# Patient Record
Sex: MALE | Race: AMERICAN INDIAN OR ALASKA NATIVE
[De-identification: names, ages, dates, MRNs, and addresses within clinical notes are randomized per-mention and may not be internally consistent; named-entity substitution may affect disease eponyms.]

---

## 2018-06-06 ENCOUNTER — HOSPITAL ENCOUNTER (INPATIENT)
Dept: HOSPITAL 31 - C.ER | Age: 58
LOS: 5 days | Discharge: HOME | DRG: 73 | End: 2018-06-11
Attending: INTERNAL MEDICINE | Admitting: INTERNAL MEDICINE
Payer: COMMERCIAL

## 2018-06-06 DIAGNOSIS — E87.6: ICD-10-CM

## 2018-06-06 DIAGNOSIS — E11.319: ICD-10-CM

## 2018-06-06 DIAGNOSIS — K27.3: ICD-10-CM

## 2018-06-06 DIAGNOSIS — E11.65: ICD-10-CM

## 2018-06-06 DIAGNOSIS — K85.90: ICD-10-CM

## 2018-06-06 DIAGNOSIS — E55.9: ICD-10-CM

## 2018-06-06 DIAGNOSIS — I12.9: ICD-10-CM

## 2018-06-06 DIAGNOSIS — E86.0: ICD-10-CM

## 2018-06-06 DIAGNOSIS — E11.43: Primary | ICD-10-CM

## 2018-06-06 DIAGNOSIS — K31.84: ICD-10-CM

## 2018-06-06 DIAGNOSIS — Z79.4: ICD-10-CM

## 2018-06-06 DIAGNOSIS — B37.81: ICD-10-CM

## 2018-06-06 DIAGNOSIS — E46: ICD-10-CM

## 2018-06-06 DIAGNOSIS — B19.20: ICD-10-CM

## 2018-06-06 DIAGNOSIS — E11.22: ICD-10-CM

## 2018-06-06 DIAGNOSIS — K29.70: ICD-10-CM

## 2018-06-06 DIAGNOSIS — F17.210: ICD-10-CM

## 2018-06-06 DIAGNOSIS — F11.23: ICD-10-CM

## 2018-06-06 DIAGNOSIS — N18.9: ICD-10-CM

## 2018-06-06 DIAGNOSIS — N17.9: ICD-10-CM

## 2018-06-06 DIAGNOSIS — K92.0: ICD-10-CM

## 2018-06-06 LAB
ALBUMIN SERPL-MCNC: 4.1 G/DL (ref 3.5–5)
ALBUMIN/GLOB SERPL: 1 {RATIO} (ref 1–2.1)
ALT SERPL-CCNC: 17 U/L (ref 21–72)
AST SERPL-CCNC: 54 U/L (ref 17–59)
BACTERIA #/AREA URNS HPF: (no result) /[HPF]
BASOPHILS # BLD AUTO: 0 K/UL (ref 0–0.2)
BASOPHILS NFR BLD: 0.3 % (ref 0–2)
BILIRUB UR-MCNC: NEGATIVE MG/DL
BUN SERPL-MCNC: 75 MG/DL (ref 9–20)
CALCIUM SERPL-MCNC: 9.4 MG/DL (ref 8.6–10.4)
CK MB SERPL-MCNC: 1.75 NG/ML (ref 0–3.38)
EOSINOPHIL # BLD AUTO: 0 K/UL (ref 0–0.7)
EOSINOPHIL NFR BLD: 0.1 % (ref 0–4)
ERYTHROCYTE [DISTWIDTH] IN BLOOD BY AUTOMATED COUNT: 13.3 % (ref 11.5–14.5)
GFR NON-AFRICAN AMERICAN: 23
GLUCOSE UR STRIP-MCNC: NORMAL MG/DL
HGB BLD-MCNC: 17.8 G/DL (ref 12–18)
INR PPP: 1.2
LEUKOCYTE ESTERASE UR-ACNC: (no result) LEU/UL
LIPASE: 560 U/L (ref 23–300)
LYMPHOCYTES # BLD AUTO: 2.8 K/UL (ref 1–4.3)
LYMPHOCYTES NFR BLD AUTO: 21.9 % (ref 20–40)
MCH RBC QN AUTO: 27.6 PG (ref 27–31)
MCHC RBC AUTO-ENTMCNC: 33 G/DL (ref 33–37)
MCV RBC AUTO: 83.5 FL (ref 80–94)
MONOCYTES # BLD: 1 K/UL (ref 0–0.8)
MONOCYTES NFR BLD: 7.4 % (ref 0–10)
NEUTROPHILS # BLD: 9.1 K/UL (ref 1.8–7)
NEUTROPHILS NFR BLD AUTO: 70.3 % (ref 50–75)
NRBC BLD AUTO-RTO: 0.9 % (ref 0–2)
PH UR STRIP: 5 [PH] (ref 5–8)
PLATELET # BLD: 286 K/UL (ref 130–400)
PMV BLD AUTO: 9.7 FL (ref 7.2–11.7)
PROT UR STRIP-MCNC: (no result) MG/DL
PROTHROMBIN TIME: 13.5 SECONDS (ref 9.7–12.2)
RBC # BLD AUTO: 6.47 MIL/UL (ref 4.4–5.9)
RBC # UR STRIP: NEGATIVE /UL
SP GR UR STRIP: 1.02 (ref 1–1.03)
TROPONIN I SERPL-MCNC: 0.03 NG/ML (ref 0–0.12)
UROBILINOGEN UR-MCNC: NORMAL MG/DL (ref 0.2–1)
WBC # BLD AUTO: 13 K/UL (ref 4.8–10.8)

## 2018-06-06 RX ADMIN — HUMAN INSULIN SCH: 100 INJECTION, SOLUTION SUBCUTANEOUS at 22:24

## 2018-06-06 NOTE — C.PDOC
Chief Complaint (Nursing): Abnormal Labs





Past Medical History


Vital Signs: 





 Last Vital Signs











Temp  97.6 F   06/06/18 15:03


 


Pulse  120 H  06/06/18 15:03


 


Resp  18   06/06/18 15:03


 


BP  173/137 H  06/06/18 15:03


 


Pulse Ox  98   06/06/18 15:03














- Social History


Hx Alcohol Use: No


Hx Substance Use: Yes





ED Course And Treatment


O2 Sat by Pulse Oximetry: 98





Disposition





- Disposition

## 2018-06-06 NOTE — C.PDOC
History Of Present Illness


57 y/o male with PMHx of diabetes and hypertension sent to the ED by Dr. Martinez 

for abnormal labs. Patient has been vomiting for a few days and feeling 

dehydrated as per Dr. Martinez. He then had blood work done, revealing elevated 

blood sugar and slightly elevated white count, with creatinine of 2 and BUN 67. 

Dr. Martinez also felt the patient may have gastritis. Otherwise denies any 

diarrhea, fever, chills, or other complaints. 





Time Seen by Provider: 18 15:14


Chief Complaint (Nursing): Abnormal Labs


History Per: Patient


History/Exam Limitations: no limitations


Onset/Duration Of Symptoms: Days


Current Symptoms Are (Timing): Still Present





Past Medical History


Reviewed: Historical Data, Nursing Documentation, Vital Signs


Vital Signs: 


 Last Vital Signs











Temp  97.6 F   18 15:03


 


Pulse  89   18 17:40


 


Resp  18   18 17:40


 


BP  198/108 H  18 17:40


 


Pulse Ox  98   18 17:52














- Medical History


PMH: Diabetes, HTN


Family History: States: No Known Family Hx





- Social History


Hx Alcohol Use: No


Hx Substance Use: Yes





Review Of Systems


Except As Marked, All Systems Reviewed And Found Negative.


Constitutional: Positive for: Other (feeling dehydrated).  Negative for: Fever, 

Chills


Cardiovascular: Negative for: Chest Pain


Respiratory: Negative for: Shortness of Breath


Gastrointestinal: Positive for: Nausea, Vomiting.  Negative for: Diarrhea





Physical Exam





- Physical Exam


Appears: Non-toxic, No Acute Distress


Skin: Normal Color, Warm, Dry


Head: Atraumatic, Normacephalic


Eye(s): bilateral: Normal Inspection, PERRL, EOMI


Nose: Normal


Oral Mucosa: Moist


Neck: Normal ROM, Supple


Cardiovascular: Rhythm Regular, No Murmur


Respiratory: Normal Breath Sounds, No Rales, No Rhonchi, No Wheezing


Gastrointestinal/Abdominal: Soft, Tenderness (epigastric), No Guarding, No 

Rebound


Back: Normal Inspection, No CVA Tenderness, No Vertebral Tenderness


Extremity: Bilateral: Atraumatic, Normal Color And Temperature, Normal ROM


Pulses: Left Dorsalis Pedis: Normal, Right Dorsalis Pedis: Normal


Neurological/Psych: Oriented x3, Normal Speech, Other (No focal deficits)


Gait: Steady





ED Course And Treatment





- Laboratory Results


Result Diagrams: 


 18 16:35





 18 16:35


Lab Interpretation: Abnormal


ECG: Interpreted By Me


ECG Rhythm: Sinus Rhythm, Nonspecific Changes


ECG Interpretation: No Acute Changes


Rate From EC


O2 Sat by Pulse Oximetry: 98 (RA)


Pulse Ox Interpretation: Normal





- Radiology


CXR: Interpreted by Me


CXR Interpretation: Yes: No Acute Disease


Progress Note: Treated with NSS x 1 liters, protonix and maalox.  Treated with 

clonidine 0.1 mg PO for elevated B/P


Reassessment Condition: Improved





- Physician Consult Information


Time Consulting Physician Contacted: 17:07


Physician Contacted: Adithya Martinez


Outcome Of Conversation: Discussed case with Dr. Martinez, patient accepted to 

his service for admission





Medical Decision Making


Medical Decision Making: 





Initial Plan:


--EKG


--Lipase


--CMP


--CBC


--Prothrombin time


--Accucheck


--Urinalysis


--Pepcid 20 mg IVP


--Zofran 4 mg IVP


--Maalox 30 ml PO


--IV fluids





Labs reviewed, revealing elevated BUN and creatitine. Glucose 219. Lipase 

elevated, 560.





Paged Dr. Martinez. Patient will be admitted for dehydration and pancreatitis. 








Disposition


Discussed With : Adithya Martinez


Doctor Will See Patient In The: Hospital


Counseled Patient/Family Regarding: Studies Performed, Diagnosis





- Disposition


Disposition: HOSPITALIZED


Disposition Time: 17:09


Condition: STABLE





- POA


Present On Arrival: None





- Clinical Impression


Clinical Impression: 


 Dehydration, Pancreatitis








- PA / NP / Resident Statement


MD/DO has reviewed & agrees with the documentation as recorded.





- Scribe Statement


The provider has reviewed the documentation as recorded by the Scribe (Carmen Last)





All medical record entries made by the Scribe were at my direction and 

personally dictated by me. I have reviewed the chart and agree that the record 

accurately reflects my personal performance of the history, physical exam, 

medical decision making, and the department course for this patient. I have 

also personally directed, reviewed, and agree with the discharge instructions 

and disposition.





Decision To Admit





- Pt Status Changed To:


Hospital Disposition Of: Inpatient





- Admit Certification


Admit to Inpatient:: After my assessment, the patient will require 

hospitalization for at least two midnights.  This is because of the severity of 

symptoms shown, intensity of services needed, and/or the medical risk in this 

patient being treated as an outpatient.





- InPatient:


Physician Admission Certification: I certify that this patient requires 2 or 

more midnights of care for the following reason:: Dehydration.  Pancreatitis





- .


Bed Request Type: Regular


Admitting Physician: Adithya Martinez


Patient Diagnosis: 


 Dehydration, Pancreatitis

## 2018-06-07 VITALS — RESPIRATION RATE: 20 BRPM

## 2018-06-07 LAB
ALBUMIN SERPL-MCNC: 3.7 G/DL (ref 3.5–5)
ALBUMIN/GLOB SERPL: 1.1 {RATIO} (ref 1–2.1)
ALT SERPL-CCNC: 21 U/L (ref 21–72)
AST SERPL-CCNC: 32 U/L (ref 17–59)
BASOPHILS # BLD AUTO: 0 K/UL (ref 0–0.2)
BASOPHILS NFR BLD: 0.1 % (ref 0–2)
BUN SERPL-MCNC: 60 MG/DL (ref 9–20)
CALCIUM SERPL-MCNC: 8.9 MG/DL (ref 8.6–10.4)
EOSINOPHIL # BLD AUTO: 0 K/UL (ref 0–0.7)
EOSINOPHIL NFR BLD: 0 % (ref 0–4)
ERYTHROCYTE [DISTWIDTH] IN BLOOD BY AUTOMATED COUNT: 13.2 % (ref 11.5–14.5)
GFR NON-AFRICAN AMERICAN: 27
HDLC SERPL-MCNC: 59 MG/DL (ref 30–70)
HGB BLD-MCNC: 16.6 G/DL (ref 12–18)
LDLC SERPL-MCNC: 78 MG/DL (ref 0–129)
LIPASE: 291 U/L (ref 23–300)
LYMPHOCYTES # BLD AUTO: 3.3 K/UL (ref 1–4.3)
LYMPHOCYTES NFR BLD AUTO: 26.1 % (ref 20–40)
MCH RBC QN AUTO: 28.2 PG (ref 27–31)
MCHC RBC AUTO-ENTMCNC: 33.7 G/DL (ref 33–37)
MCV RBC AUTO: 83.7 FL (ref 80–94)
MONOCYTES # BLD: 0.9 K/UL (ref 0–0.8)
MONOCYTES NFR BLD: 6.8 % (ref 0–10)
NEUTROPHILS # BLD: 8.6 K/UL (ref 1.8–7)
NEUTROPHILS NFR BLD AUTO: 67 % (ref 50–75)
NRBC BLD AUTO-RTO: 0.1 % (ref 0–2)
PLATELET # BLD: 255 K/UL (ref 130–400)
PMV BLD AUTO: 9.7 FL (ref 7.2–11.7)
RBC # BLD AUTO: 5.89 MIL/UL (ref 4.4–5.9)
WBC # BLD AUTO: 12.8 K/UL (ref 4.8–10.8)

## 2018-06-07 PROCEDURE — 0DB68ZX EXCISION OF STOMACH, VIA NATURAL OR ARTIFICIAL OPENING ENDOSCOPIC, DIAGNOSTIC: ICD-10-PCS | Performed by: SPECIALIST

## 2018-06-07 RX ADMIN — DEXTROSE AND SODIUM CHLORIDE SCH MLS/HR: 5; 450 INJECTION, SOLUTION INTRAVENOUS at 21:10

## 2018-06-07 RX ADMIN — WATER SCH MLS: 1 INJECTION INTRAMUSCULAR; INTRAVENOUS; SUBCUTANEOUS at 12:08

## 2018-06-07 RX ADMIN — HUMAN INSULIN SCH: 100 INJECTION, SOLUTION SUBCUTANEOUS at 08:25

## 2018-06-07 RX ADMIN — HUMAN INSULIN SCH: 100 INJECTION, SOLUTION SUBCUTANEOUS at 21:57

## 2018-06-07 RX ADMIN — ENOXAPARIN SODIUM SCH: 40 INJECTION SUBCUTANEOUS at 11:01

## 2018-06-07 RX ADMIN — HUMAN INSULIN SCH: 100 INJECTION, SOLUTION SUBCUTANEOUS at 12:55

## 2018-06-07 RX ADMIN — DEXTROSE AND SODIUM CHLORIDE SCH MLS/HR: 5; 450 INJECTION, SOLUTION INTRAVENOUS at 10:50

## 2018-06-07 RX ADMIN — HUMAN INSULIN SCH UNIT: 100 INJECTION, SOLUTION SUBCUTANEOUS at 17:10

## 2018-06-07 RX ADMIN — WATER SCH MLS/HR: 1 INJECTION INTRAMUSCULAR; INTRAVENOUS; SUBCUTANEOUS at 20:50

## 2018-06-07 NOTE — CARD
--------------- APPROVED REPORT --------------





EKG Measurement

Heart [id number]

ME [id number]

[id number]

[id number]

[id number]



<Conclusion>

Normal sinus rhythm

T wave abnormality: NONSPECIFIC

Abnormal ECG

## 2018-06-07 NOTE — CP.PCM.PN
Subjective





- Date & Time of Evaluation


Date of Evaluation: 06/07/18


Time of Evaluation: 09:45





- Subjective


Subjective: 














Progress note.

















Attending: Dr. Martinez

















Pt seen and examined at bedside. No acute distress. No events overnight. Pt 

does admit to heroin abuse, psych consult placed. No fevers, chills, no more 

vomiting, no diarrhea. Pt is legally blind. 























PMH: Heroin abuse, DM, HTN, legally blind








PSH: 











Allergies: NKDA











FH: Denies











Social hx: Denies smoking. Denies current drinking. Reports current heroin use. 

Intransal. 3-4 bags per day. Last use 2 days ago. 





Objective





- Vital Signs/Intake and Output


Vital Signs (last 24 hours): 


 











Temp Pulse Resp BP Pulse Ox


 


 97.9 F   96 H  20   157/93 H  97 


 


 06/07/18 08:00  06/07/18 08:00  06/07/18 08:00  06/07/18 08:00  06/07/18 08:00








Intake and Output: 


 











 06/07/18 06/07/18





 06:59 18:59


 


Intake Total  800


 


Output Total  500


 


Balance  300














- Medications


Medications: 


 Current Medications





Amlodipine Besylate (Norvasc)  10 mg PO DAILY PRABHA


Dextrose (Dextrose 50% Inj)  0 ml IV STAT PRN; Protocol


   PRN Reason: Hypoglycemia Protocol


Dextrose (Glutose 15)  0 gm PO ONCE PRN; Protocol


   PRN Reason: Hypoglycemia Protocol


Enoxaparin Sodium (Lovenox)  40 mg SC DAILY PRABHA


Glucagon (Glucagen Diagnostic Kit)  0 mg IM STAT PRN; Protocol


   PRN Reason: Hypoglycemia Protocol


Hydromorphone HCl (Dilaudid)  1 mg PO Q4H PRN


   PRN Reason: Pain, moderate (4-7)


Sodium Chloride (Sodium Chloride 0.45%)  1,000 mls @ 100 mls/hr IV .Q10H PRABHA


   Last Admin: 06/07/18 08:31 Dose:  100 mls/hr


Dextrose (Dextrose 5% In Water 1000 Ml)  1,000 mls @ 0 mls/hr IV .Q0M PRN; 

Protocol; Per Protocol


   PRN Reason: Hypoglycemia Protocol


Insulin Human Regular (Novolin R)  0 unit SC ACHS PRABHA


   PRN Reason: Protocol


   Last Admin: 06/07/18 08:25 Dose:  Not Given


Ondansetron HCl (Zofran Inj)  4 mg IVP Q6 PRN


   PRN Reason: Nausea/Vomiting


Pantoprazole Sodium (Protonix Inj)  40 mg IVP BID PRABHA


Pneumococcal Polyvalent Vaccine (Pneumovax 23 Vaccine)  0.5 ml IM .ONCE ONE


   Stop: 06/08/18 10:01











- Labs


Labs: 


 





 06/07/18 07:13 





 06/07/18 07:23 





 











PT  13.5 SECONDS (9.7-12.2)  H  06/06/18  16:35    


 


INR  1.2   06/06/18  16:35    














- Constitutional


Appears: Non-toxic, No Acute Distress, Unkempt





- Head Exam


Head Exam: ATRAUMATIC, NORMAL INSPECTION, NORMOCEPHALIC





- Eye Exam


Eye Exam: EOMI





- ENT Exam


ENT Exam: Mucous Membranes Moist





- Neck Exam


Neck Exam: Full ROM, Normal Inspection





- Respiratory Exam


Respiratory Exam: NORMAL BREATHING PATTERN.  absent: Respiratory Distress





- Cardiovascular Exam


Cardiovascular Exam: REGULAR RHYTHM, +S1, +S2





- GI/Abdominal Exam


GI & Abdominal Exam: Soft, Normal Bowel Sounds.  absent: Tenderness





- Extremities Exam


Extremities Exam: Full ROM, Normal Inspection





- Back Exam


Back Exam: NORMAL INSPECTION





- Neurological Exam


Neurological Exam: Alert, Awake, Oriented x3





- Psychiatric Exam


Psychiatric exam: Flat Affect





- Skin


Skin Exam: Dry, Intact, Normal Color, Warm





Assessment and Plan





- Assessment and Plan (Free Text)


Assessment: 

















This is a 59 yo male with

















1. Pancreatitis














-NPO


-GI consult. Dr. Lee. recs appreciated.


-dilaudid 1 mg po q 4 hrs prn


-zofran prn


-1/2  cc/hr


-lipase 560


-abdominal ultrasound pending 














2. Vomiting











-zofran prn nausea


-r/o coffee ground emesis?


-protonix IV bid


-GI consult. recs appreciated. Dr. Lee














3. Increased BUN/CR








-nephrology consult. Dr. Stanley. recs appreciated.


-unclear etiology at this time

















4. Hx of HTN














-continue norvasc 10 mg po daily

















5. hx of DM








-ISS


-hypoglycemia protocol


-check HGB a 1C


-check lipid panel




















6. GI/DVT ppx








-protonix iv bid


-scds











discussed with Dr. Martinez

## 2018-06-07 NOTE — RAD
HISTORY:

abdominal pain  



COMPARISON:

No prior.



TECHNIQUE:

Chest PA and lateral



FINDINGS:



LUNGS:

No active pulmonary disease.



PLEURA:

No significant pleural effusion identified. No pneumothorax apparent.



CARDIOVASCULAR:

Normal.



OSSEOUS STRUCTURES:

No significant abnormalities.



VISUALIZED UPPER ABDOMEN:

Normal.



OTHER FINDINGS:

None.



IMPRESSION:

No acute cardiopulmonary disease appreciated.

## 2018-06-07 NOTE — US
Abdominal ultrasound 



History: Abdominal pain.  Pancreatitis. 



Comparison: None available. 



Technique: Real-time sonography was performed through the abdomen. 



Findings:



Limited study secondary to gas distention of bowel. 



Liver: 11.9 centimeters in length.  Normal echogenicity. 



Gallbladder: No calculi or sludge.  Normal wall thickening of 2.5 

millimeters.  Negative sonographic Cochran's sign. 



Common bile duct measures 1.1 centimeter, dilated. 



Pancreas not well visualized. 



Spleen measures 7.3 centimeters in length, within normal limits. 



Bold 



Visualized aorta and IVC are preserved. 



Right kidney: 8.5 x 5.4 x 5.2 centimeters. Increased echogenicity of 

the renal parenchymal cortex suggestive for medical renal disease. No 

calculi or hydronephrosis. 



Left kidney: 8.8 x 5.2 x 5.5 centimeters. Increased echogenicity of 

the renal parenchymal cortex suggestive for medical renal disease.  

No calculi or hydronephrosis. 



Impression: 



Technically limited study. 



Pancreas not well visualized.  Correlation with CT scan would be 

helpful to exclude pancreatitis. 



Dilated common bile duct measuring up to 1.1 centimeters.  

Correlation with MRCP may be helpful for further evaluation if 

clinically indicated. 



Increased echogenicity of the bilateral renal parenchymal cortices 

suggestive for medical renal disease.  Clinical correlation.

## 2018-06-07 NOTE — CP.PCM.CON
History of Present Illness





- History of Present Illness


History of Present Illness: 


Initial Nephrology Consultation:





Assessment: Stable


recurrent nausea/vomitting with gastritis duodenitis and candidiasis


Acute Kidney Injury (N17.9) likely due to pre-renal state


Diabetic chronic Kidney Disease (E11.22) 


Hypertensive Chronic Kidney Disease (I12.9)


Chronic Kidney Disease (N18.9) Stage ? with ? mg proteinuria (R80.9) likely due 

to HTN (CKD evident by proteinuria and echogenic kidneys)


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 

methadone now


active smoker





Plan


No acute need for renal replacement therapy at this time. 


Hypertension control with meds as ordered. Patient not on ACEI/ARB due to ALLISON. 

added lopressor, continue with norvasc


Monitor Input/Output, daily weights and renal function with basic metabolic 

panel


agree with IVF





Check urine analysis, spot protein/creatinine and albumin/creatinine ratio


Check for 25-OH vitamin D, iPTH, phosphorus level HIV/Hep B and Hep C serology 





Dose meds/antibiotics for reduced GFR. Avoid fleets enema/magnesium based 

laxatives. Avoid nephrotoxins/NSAIDs/ iodinated contrast (unless needed 

emergently)


Glycemic control, smoking cessation


Further work up/management as per primary team





Thanks for allowing me to participate in care of your patient. Will follow 

patient with you. Please call if any Qs. d/w wife and team


Dr Sumit Stern


Office: [phone] Cell: [phone] Fax: [phone]





Chief Complaint; nausea/vomitting


reason for consult: ALLISON


HPI: Pt is a 58 M with hx of diabetes Mellitus (5-6 years), hypertension (5-6 

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 

takes methadone now presented with complaints of persistent nausea/vomitting 

for last few days with upper abdomen pain. renal consult for ALLISON. pt or wife 

not aware about kidney disease in past


Denies OTC/herbal meds has taken NSAIDs as alleve for few doses only. denies 

long term nsaids


No recent iodinated contrast exposure. No obvious episodes of low BP.


feels better now. had egd done 6/7/18





ROS: 


Cardiovascular: No chest pain. 


Pulmonary: No shortness of breath 


Gastrointestinal: improved abdominal pain better nausea. better vomiting. 


Genitourinary: No pain while urinating. Denies blood in urine. reported 

decreased urine output


All other negative





Physical Examination: 


General Appearance: Comfortable, in no acute respiratory distress, co-operative 

. 


Vitals reviewed and noted as below


Head; Atraumatic, normocephalic


ENT: no ulcers no thrush. Tongue is midline. Oropharynx: no rash or ulcers.


EYES: Pupils are equal, round and reactive to light accommodation. Eye muscles 

and extraocular movement intact. Sclera is anicteric. he is legally blind


Neck; supple no lymphadenopathy, no thyromegaly or bruit


Lungs: Normal respiratory rate/effort. Breath sounds bilateral equal and clear


Heart: Normal rate. s1s2 normal. No rub or gallop. 


Extremities: no edema. No varicose veins


Neurological: Patient is alert, awake and oriented to person, place and time. 

No focal deficit. Strength bilateral appropriate and equal


Skin: Warm and dry. Normal turgor. No rash. Palpitation: Normal elasticity for 

age


Abdomen: Abdomen is soft. Bowel sounds +. There is no abdominal tenderness, no 

guarding/rigidity no organomegaly


Psych: limited insight and normal affect/mood


MSK: no joint tenderness or swelling. Digits and nails normal, no deformity


: kidney or bladder not palpable





Labs/imaging reviewed.


Past medical history, past surgical history, family history, social history, 

allergy reviewed and noted as below


Family hx: no hx of CKD. Rest non-contributory 





renal imaging: b/l echogenic kidneys


UA: 2+ protein











Past Patient History





- Past Medical History & Family History


Past Medical History?: Yes





- Past Social History


Smoking Status: Light Smoker < 10 Cigarettes Daily





- CARDIAC


Hx Hypertension: Yes





- HEENT


Hx HEENT Problems: Yes


Hx Blind: Yes (LEGALLY)





- ENDOCRINE/METABOLIC


Hx Endocrine Disorders: Yes


Hx Diabetes Mellitus Type 2: Yes





- MUSCULOSKELETAL/RHEUMATOLOGICAL


Hx Falls: No





- PSYCHIATRIC


Hx Substance Use: Yes





- ANESTHESIA


Hx Anesthesia: Yes


Hx Anesthesia Reactions: No





Meds


Allergies/Adverse Reactions: 


 Allergies











Allergy/AdvReac Type Severity Reaction Status Date / Time


 


No Known Allergies Allergy   Verified 06/06/18 15:06














- Medications


Medications: 


 Current Medications





Amlodipine Besylate (Norvasc)  10 mg PO DAILY [facility]


Dextrose (Dextrose 50% Inj)  0 ml IV STAT PRN; Protocol


   PRN Reason: Hypoglycemia Protocol


Dextrose (Glutose 15)  0 gm PO ONCE PRN; Protocol


   PRN Reason: Hypoglycemia Protocol


Enoxaparin Sodium (Lovenox)  40 mg SC DAILY [facility]


   Last Admin: 06/07/18 11:01 Dose:  Not Given


Fluconazole (Diflucan)  200 mg PO DAILY [facility]


   PRN Reason: Protocol


   Stop: 06/21/18 23:59


   Last Admin: 06/07/18 13:14 Dose:  200 mg


Glucagon (Glucagen Diagnostic Kit)  0 mg IM STAT PRN; Protocol


   PRN Reason: Hypoglycemia Protocol


Hydromorphone HCl (Dilaudid)  1 mg PO Q4H PRN


   PRN Reason: Pain, moderate (4-7)


Dextrose (Dextrose 5% In Water 1000 Ml)  1,000 mls @ 0 mls/hr IV .Q0M PRN; 

Protocol; Per Protocol


   PRN Reason: Hypoglycemia Protocol


Dextrose/Sodium Chloride (Dextrose 5%/0.45% Ns 1000 Ml)  1,000 mls @ 100 mls/hr 

IV .Q10H [facility]


   Last Admin: 06/07/18 10:50 Dose:  100 mls/hr


Metronidazole (Flagyl)  500 mg in 100 mls @ 100 mls/hr IVPB Q8H [facility]


   PRN Reason: Protocol


   Last Admin: 06/07/18 12:08 Dose:  100 mls


Insulin Human Regular (Novolin R)  0 unit SC ACHS PRAB[facility]


   PRN Reason: Protocol


   Last Admin: 06/07/18 12:55 Dose:  Not Given


Metoprolol Tartrate (Lopressor)  25 mg PO BID [facility]


   Last Admin: 06/07/18 13:13 Dose:  25 mg


Ondansetron HCl (Zofran Inj)  4 mg IVP Q6 PRN


   PRN Reason: Nausea/Vomiting


Pantoprazole Sodium (Protonix Inj)  40 mg IVP BID [facility]


   Last Admin: 06/07/18 10:54 Dose:  40 mg


Pneumococcal Polyvalent Vaccine (Pneumovax 23 Vaccine)  0.5 ml IM .ONCE ONE


   Stop: 06/09/18 10:01


Sucralfate (Carafate Tab)  1 gm PO TID [facility]


   Last Admin: 06/07/18 13:14 Dose:  1 gm











Results





- Vital Signs


Recent Vital Signs: 


 Last Vital Signs











Temp  97.6 F   06/07/18 12:56


 


Pulse  108 H  06/07/18 12:56


 


Resp  18   06/07/18 12:56


 


BP  163/106 H  06/07/18 13:13


 


Pulse Ox  98   06/07/18 12:56














- Labs


Result Diagrams: 


 06/07/18 07:13





 06/07/18 07:23


Labs: 


 Laboratory Results - last 24 hr











  06/06/18 06/06/18 06/06/18





  15:15 16:35 16:35


 


WBC   13.0 H 


 


RBC   6.47 H 


 


Hgb   17.8 


 


Hct   54.0 H 


 


MCV   83.5 


 


MCH   27.6 


 


MCHC   33.0 


 


RDW   13.3 


 


Plt Count   286 


 


MPV   9.7 


 


Neut % (Auto)   70.3 


 


Lymph % (Auto)   21.9 


 


Mono % (Auto)   7.4 


 


Eos % (Auto)   0.1 


 


Baso % (Auto)   0.3 


 


Neut # (Auto)   9.1 H 


 


Lymph # (Auto)   2.8 


 


Mono # (Auto)   1.0 H 


 


Eos # (Auto)   0.0 


 


Baso # (Auto)   0.0 


 


Differential Comment    


 


PT    13.5 H


 


INR    1.2


 


Sodium   


 


Potassium   


 


Chloride   


 


Carbon Dioxide   


 


Anion Gap   


 


BUN   


 


Creatinine   


 


Est GFR ( Amer)   


 


Est GFR (Non-Af Amer)   


 


POC Glucose (mg/dL)  219 H  


 


Random Glucose   


 


Hemoglobin A1c   


 


Calcium   


 


Total Bilirubin   


 


AST   


 


ALT   


 


Alkaline Phosphatase   


 


CK-MB (Mass)   


 


Troponin I   


 


Total Protein   


 


Albumin   


 


Globulin   


 


Albumin/Globulin Ratio   


 


Triglycerides   


 


Cholesterol   


 


LDL Cholesterol Direct   


 


HDL Cholesterol   


 


Lipase   


 


Urine Color   


 


Urine Clarity   


 


Urine pH   


 


Ur Specific Gravity   


 


Urine Protein   


 


Urine Glucose (UA)   


 


Urine Ketones   


 


Urine Blood   


 


Urine Nitrate   


 


Urine Bilirubin   


 


Urine Urobilinogen   


 


Ur Leukocyte Esterase   


 


Urine WBC (Auto)   


 


Urine RBC (Auto)   


 


Urine Bacteria   


 


Urine Opiates Screen   


 


Urine Methadone Screen   


 


Ur Barbiturates Screen   


 


Ur Phencyclidine Scrn   


 


Ur Amphetamines Screen   


 


U Benzodiazepines Scrn   


 


U Oth Cocaine Metabols   


 


U Cannabinoids Screen   














  06/06/18 06/06/18 06/06/18





  16:35 16:35 21:30


 


WBC   


 


RBC   


 


Hgb   


 


Hct   


 


MCV   


 


MCH   


 


MCHC   


 


RDW   


 


Plt Count   


 


MPV   


 


Neut % (Auto)   


 


Lymph % (Auto)   


 


Mono % (Auto)   


 


Eos % (Auto)   


 


Baso % (Auto)   


 


Neut # (Auto)   


 


Lymph # (Auto)   


 


Mono # (Auto)   


 


Eos # (Auto)   


 


Baso # (Auto)   


 


Differential Comment   


 


PT   


 


INR   


 


Sodium  137  


 


Potassium  3.9  


 


Chloride  94 L  


 


Carbon Dioxide  25  


 


Anion Gap  22 H  


 


BUN  75 H  


 


Creatinine  2.8 H  


 


Est GFR ( Amer)  28  


 


Est GFR (Non-Af Amer)  23  


 


POC Glucose (mg/dL)   


 


Random Glucose  195 H  


 


Hemoglobin A1c   


 


Calcium  9.4  


 


Total Bilirubin  1.5 H  


 


AST  54  


 


ALT  17 L  


 


Alkaline Phosphatase  121  


 


CK-MB (Mass)   1.75 


 


Troponin I   0.0300 


 


Total Protein  8.1  


 


Albumin  4.1  


 


Globulin  4.1 H  


 


Albumin/Globulin Ratio  1.0  


 


Triglycerides   


 


Cholesterol   


 


LDL Cholesterol Direct   


 


HDL Cholesterol   


 


Lipase  560 H  


 


Urine Color    Yellow


 


Urine Clarity    Clear


 


Urine pH    5.0


 


Ur Specific Gravity    1.017


 


Urine Protein    2+ H


 


Urine Glucose (UA)    Normal


 


Urine Ketones    Negative


 


Urine Blood    Negative


 


Urine Nitrate    Negative


 


Urine Bilirubin    Negative


 


Urine Urobilinogen    Normal


 


Ur Leukocyte Esterase    Neg


 


Urine WBC (Auto)    1


 


Urine RBC (Auto)    3


 


Urine Bacteria    Rare


 


Urine Opiates Screen   


 


Urine Methadone Screen   


 


Ur Barbiturates Screen   


 


Ur Phencyclidine Scrn   


 


Ur Amphetamines Screen   


 


U Benzodiazepines Scrn   


 


U Oth Cocaine Metabols   


 


U Cannabinoids Screen   














  06/06/18 06/07/18 06/07/18





  22:04 07:13 07:23


 


WBC   12.8 H 


 


RBC   5.89 


 


Hgb   16.6 


 


Hct   49.3 


 


MCV   83.7 


 


MCH   28.2 


 


MCHC   33.7 


 


RDW   13.2 


 


Plt Count   255 


 


MPV   9.7 


 


Neut % (Auto)   67.0 


 


Lymph % (Auto)   26.1 


 


Mono % (Auto)   6.8 


 


Eos % (Auto)   0.0 


 


Baso % (Auto)   0.1 


 


Neut # (Auto)   8.6 H 


 


Lymph # (Auto)   3.3 


 


Mono # (Auto)   0.9 H 


 


Eos # (Auto)   0.0 


 


Baso # (Auto)   0.0 


 


Differential Comment   


 


PT   


 


INR   


 


Sodium    140


 


Potassium    3.6


 


Chloride    100


 


Carbon Dioxide    27


 


Anion Gap    17


 


BUN    60 H


 


Creatinine    2.5 H


 


Est GFR ( Amer)    32


 


Est GFR (Non-Af Amer)    27


 


POC Glucose (mg/dL)  88  


 


Random Glucose    61 L


 


Hemoglobin A1c   


 


Calcium    8.9


 


Total Bilirubin    1.1


 


AST    32


 


ALT    21  D


 


Alkaline Phosphatase    103


 


CK-MB (Mass)   


 


Troponin I   


 


Total Protein    7.2


 


Albumin    3.7


 


Globulin    3.4


 


Albumin/Globulin Ratio    1.1


 


Triglycerides    78


 


Cholesterol    159


 


LDL Cholesterol Direct    78


 


HDL Cholesterol    59


 


Lipase    291


 


Urine Color   


 


Urine Clarity   


 


Urine pH   


 


Ur Specific Gravity   


 


Urine Protein   


 


Urine Glucose (UA)   


 


Urine Ketones   


 


Urine Blood   


 


Urine Nitrate   


 


Urine Bilirubin   


 


Urine Urobilinogen   


 


Ur Leukocyte Esterase   


 


Urine WBC (Auto)   


 


Urine RBC (Auto)   


 


Urine Bacteria   


 


Urine Opiates Screen   


 


Urine Methadone Screen   


 


Ur Barbiturates Screen   


 


Ur Phencyclidine Scrn   


 


Ur Amphetamines Screen   


 


U Benzodiazepines Scrn   


 


U Oth Cocaine Metabols   


 


U Cannabinoids Screen   














  06/07/18 06/07/18 06/07/18





  07:26 07:28 09:52


 


WBC   


 


RBC   


 


Hgb   


 


Hct   


 


MCV   


 


MCH   


 


MCHC   


 


RDW   


 


Plt Count   


 


MPV   


 


Neut % (Auto)   


 


Lymph % (Auto)   


 


Mono % (Auto)   


 


Eos % (Auto)   


 


Baso % (Auto)   


 


Neut # (Auto)   


 


Lymph # (Auto)   


 


Mono # (Auto)   


 


Eos # (Auto)   


 


Baso # (Auto)   


 


Differential Comment   


 


PT   


 


INR   


 


Sodium   


 


Potassium   


 


Chloride   


 


Carbon Dioxide   


 


Anion Gap   


 


BUN   


 


Creatinine   


 


Est GFR ( Amer)   


 


Est GFR (Non-Af Amer)   


 


POC Glucose (mg/dL)  69  74  80


 


Random Glucose   


 


Hemoglobin A1c   


 


Calcium   


 


Total Bilirubin   


 


AST   


 


ALT   


 


Alkaline Phosphatase   


 


CK-MB (Mass)   


 


Troponin I   


 


Total Protein   


 


Albumin   


 


Globulin   


 


Albumin/Globulin Ratio   


 


Triglycerides   


 


Cholesterol   


 


LDL Cholesterol Direct   


 


HDL Cholesterol   


 


Lipase   


 


Urine Color   


 


Urine Clarity   


 


Urine pH   


 


Ur Specific Gravity   


 


Urine Protein   


 


Urine Glucose (UA)   


 


Urine Ketones   


 


Urine Blood   


 


Urine Nitrate   


 


Urine Bilirubin   


 


Urine Urobilinogen   


 


Ur Leukocyte Esterase   


 


Urine WBC (Auto)   


 


Urine RBC (Auto)   


 


Urine Bacteria   


 


Urine Opiates Screen   


 


Urine Methadone Screen   


 


Ur Barbiturates Screen   


 


Ur Phencyclidine Scrn   


 


Ur Amphetamines Screen   


 


U Benzodiazepines Scrn   


 


U Oth Cocaine Metabols   


 


U Cannabinoids Screen   














  06/07/18 06/07/18 06/07/18





  09:58 11:54 12:48


 


WBC   


 


RBC   


 


Hgb   


 


Hct   


 


MCV   


 


MCH   


 


MCHC   


 


RDW   


 


Plt Count   


 


MPV   


 


Neut % (Auto)   


 


Lymph % (Auto)   


 


Mono % (Auto)   


 


Eos % (Auto)   


 


Baso % (Auto)   


 


Neut # (Auto)   


 


Lymph # (Auto)   


 


Mono # (Auto)   


 


Eos # (Auto)   


 


Baso # (Auto)   


 


Differential Comment   


 


PT   


 


INR   


 


Sodium   


 


Potassium   


 


Chloride   


 


Carbon Dioxide   


 


Anion Gap   


 


BUN   


 


Creatinine   


 


Est GFR ( Amer)   


 


Est GFR (Non-Af Amer)   


 


POC Glucose (mg/dL)    96


 


Random Glucose   


 


Hemoglobin A1c  7.2 H  


 


Calcium   


 


Total Bilirubin   


 


AST   


 


ALT   


 


Alkaline Phosphatase   


 


CK-MB (Mass)   


 


Troponin I   


 


Total Protein   


 


Albumin   


 


Globulin   


 


Albumin/Globulin Ratio   


 


Triglycerides   


 


Cholesterol   


 


LDL Cholesterol Direct   


 


HDL Cholesterol   


 


Lipase   


 


Urine Color   


 


Urine Clarity   


 


Urine pH   


 


Ur Specific Gravity   


 


Urine Protein   


 


Urine Glucose (UA)   


 


Urine Ketones   


 


Urine Blood   


 


Urine Nitrate   


 


Urine Bilirubin   


 


Urine Urobilinogen   


 


Ur Leukocyte Esterase   


 


Urine WBC (Auto)   


 


Urine RBC (Auto)   


 


Urine Bacteria   


 


Urine Opiates Screen   Positive H 


 


Urine Methadone Screen   Negative 


 


Ur Barbiturates Screen   Negative 


 


Ur Phencyclidine Scrn   Negative 


 


Ur Amphetamines Screen   Negative 


 


U Benzodiazepines Scrn   Negative 


 


U Oth Cocaine Metabols   Negative 


 


U Cannabinoids Screen   Negative

## 2018-06-08 LAB
ALBUMIN SERPL-MCNC: 3.1 G/DL (ref 3.5–5)
ALBUMIN/GLOB SERPL: 1 {RATIO} (ref 1–2.1)
ALT SERPL-CCNC: 19 U/L (ref 21–72)
AST SERPL-CCNC: 35 U/L (ref 17–59)
BASOPHILS # BLD AUTO: 0 K/UL (ref 0–0.2)
BASOPHILS NFR BLD: 0.2 % (ref 0–2)
BUN SERPL-MCNC: 40 MG/DL (ref 9–20)
CALCIUM SERPL-MCNC: 8.1 MG/DL (ref 8.6–10.4)
EOSINOPHIL # BLD AUTO: 0 K/UL (ref 0–0.7)
EOSINOPHIL NFR BLD: 0.1 % (ref 0–4)
ERYTHROCYTE [DISTWIDTH] IN BLOOD BY AUTOMATED COUNT: 12.9 % (ref 11.5–14.5)
GFR NON-AFRICAN AMERICAN: 32
HEPATITIS B CORE AB: NEGATIVE
HEPATITIS C ANTIBODY: REACTIVE
HGB BLD-MCNC: 14.5 G/DL (ref 12–18)
LYMPHOCYTES # BLD AUTO: 4.2 K/UL (ref 1–4.3)
LYMPHOCYTES NFR BLD AUTO: 35.6 % (ref 20–40)
MCH RBC QN AUTO: 27.8 PG (ref 27–31)
MCHC RBC AUTO-ENTMCNC: 33.4 G/DL (ref 33–37)
MCV RBC AUTO: 83.3 FL (ref 80–94)
MONOCYTES # BLD: 0.9 K/UL (ref 0–0.8)
MONOCYTES NFR BLD: 7.6 % (ref 0–10)
NEUTROPHILS # BLD: 6.6 K/UL (ref 1.8–7)
NEUTROPHILS NFR BLD AUTO: 56.5 % (ref 50–75)
NRBC BLD AUTO-RTO: 0.1 % (ref 0–2)
PLATELET # BLD: 229 K/UL (ref 130–400)
PMV BLD AUTO: 10.3 FL (ref 7.2–11.7)
RBC # BLD AUTO: 5.21 MIL/UL (ref 4.4–5.9)
WBC # BLD AUTO: 11.7 K/UL (ref 4.8–10.8)

## 2018-06-08 RX ADMIN — DEXTROSE AND SODIUM CHLORIDE SCH MLS/HR: 5; 450 INJECTION, SOLUTION INTRAVENOUS at 13:14

## 2018-06-08 RX ADMIN — HUMAN INSULIN SCH: 100 INJECTION, SOLUTION SUBCUTANEOUS at 21:53

## 2018-06-08 RX ADMIN — HUMAN INSULIN SCH UNIT: 100 INJECTION, SOLUTION SUBCUTANEOUS at 08:25

## 2018-06-08 RX ADMIN — HUMAN INSULIN SCH UNIT: 100 INJECTION, SOLUTION SUBCUTANEOUS at 12:25

## 2018-06-08 RX ADMIN — HUMAN INSULIN SCH UNIT: 100 INJECTION, SOLUTION SUBCUTANEOUS at 16:42

## 2018-06-08 RX ADMIN — WATER SCH MLS/HR: 1 INJECTION INTRAMUSCULAR; INTRAVENOUS; SUBCUTANEOUS at 20:27

## 2018-06-08 RX ADMIN — ENOXAPARIN SODIUM SCH MG: 40 INJECTION SUBCUTANEOUS at 11:00

## 2018-06-08 RX ADMIN — DEXTROSE AND SODIUM CHLORIDE SCH: 5; 900 INJECTION, SOLUTION INTRAVENOUS at 22:34

## 2018-06-08 RX ADMIN — WATER SCH MLS/HR: 1 INJECTION INTRAMUSCULAR; INTRAVENOUS; SUBCUTANEOUS at 13:00

## 2018-06-08 RX ADMIN — WATER SCH MLS/HR: 1 INJECTION INTRAMUSCULAR; INTRAVENOUS; SUBCUTANEOUS at 03:47

## 2018-06-08 RX ADMIN — DEXTROSE AND SODIUM CHLORIDE SCH MLS/HR: 5; 900 INJECTION, SOLUTION INTRAVENOUS at 12:15

## 2018-06-08 NOTE — HP
HISTORY OF PRESENT ILLNESS:  The patient is a 58-year-old male with chief

complaint  _____ abdominal pain, weakness, _____ renal failure and

pancreatitis _____ .



PHYSICAL EXAMINATION:

GENERAL:  The patient is alert, awake, and oriented.

VITAL SIGNS:  Temperature 98, pulse 90.

HEENT:  Within normal limits.

NECK:  Supple.

CHEST:  Symmetrical.

HEART:  Regular.

ABDOMEN:  Soft.

EXTREMITIES:  No edema.



The patient suffers from _____ renal failure  _____ The patient to get

bedrest.  Supportive care.





__________________________________________

Adithya Martinez MD





DD:  06/07/2018 10:59:48

DT:  06/07/2018 11:10:03

Job # [id number]

## 2018-06-08 NOTE — PCM.PSYCH
Initial Psychiatric Evaluation





- Initial Psychiatric Evaluation


Type of Admission: Voluntary


Legal Status: Capacity


Chief Complaint (in patient's own words): 





"I feel sick"


History of Present Illness and Precipitating Events: 





The patient is seen, chart reviewed and case discussed.


Consultation was requested for his opioid withdrawal.





This is a 58-year-old  male,  with 5 children, unemployed and 

lives with his wife.





The patient is here for GI problems and he admits to using 5 bags of heroin 

intranasally for the past 4 years.


He denies alcohol, cocaine and all other drugs and he quit smoking last week.


He reports opioid withdrawal and his cows score is around 8.


No previous history of treatment.





Past psych history: Denies





Family psych history: Denies





Medical history: Legally blind, hypertension and diabetes


Current Medications: 





Active Medications











Generic Name Dose Route Start Last Admin





  Trade Name Freq  PRN Reason Stop Dose Admin


 


Amlodipine Besylate  10 mg  06/07/18 15:00  06/08/18 11:00





  Norvasc  PO   10 mg





  DAILY PRABHA   Administration


 


Dextrose  0 ml  06/07/18 10:00  





  Dextrose 50% Inj  IV   





  STAT PRN   





  Hypoglycemia Protocol   





  Protocol   


 


Dextrose  0 gm  06/07/18 10:00  





  Glutose 15  PO   





  ONCE PRN   





  Hypoglycemia Protocol   





  Protocol   


 


Enoxaparin Sodium  40 mg  06/07/18 10:00  06/08/18 11:00





  Lovenox  SC   40 mg





  DAILY PRABHA   Administration


 


Ergocalciferol  1 cap  06/08/18 10:15  06/08/18 11:00





  Drisdol 50,000 Intl Units Cap  PO   1 cap





  Q7D PRABHA   Administration


 


Fluconazole  200 mg  06/07/18 11:30  06/08/18 11:00





  Diflucan  PO  06/21/18 23:59  200 mg





  DAILY PRABHA   Administration





  Protocol   


 


Glucagon  0 mg  06/07/18 09:32  





  Glucagen Diagnostic Kit  IM   





  STAT PRN   





  Hypoglycemia Protocol   





  Protocol   


 


Hydromorphone HCl  1 mg  06/06/18 20:46  06/07/18 17:47





  Dilaudid  PO   1 mg





  Q4H PRN   Administration





  Pain, moderate (4-7)   


 


Dextrose  1,000 mls @ 0 mls/hr  06/07/18 09:32  





  Dextrose 5% In Water 1000 Ml  IV   





  .Q0M PRN   





  Hypoglycemia Protocol   





  Protocol   





  Per Protocol   


 


Metronidazole  500 mg in 100 mls @ 100 mls/hr  06/07/18 12:00  06/08/18 03:47





  Flagyl  IVPB   100 mls/hr





  Q8H PRABHA   Administration





  Protocol   


 


Dextrose/Sodium Chloride  1,000 mls @ 100 mls/hr  06/08/18 12:15  





  Dextrose 5%/0.9% Ns 1000 Ml  IV   





  .Q10H PRABHA   


 


Insulin Human Regular  0 unit  06/06/18 22:00  06/08/18 08:25





  Novolin R  SC   2 unit





  ACHS PRABHA   Administration





  Protocol   


 


Methadone HCl  15 mg  06/08/18 12:45  





  Methadone  PO  06/08/18 12:46  





  ONCE ONE   


 


Metoprolol Tartrate  25 mg  06/07/18 10:45  06/08/18 11:00





  Lopressor  PO   25 mg





  BID PRABHA   Administration


 


Ondansetron HCl  4 mg  06/06/18 20:46  





  Zofran Inj  IVP   





  Q6 PRN   





  Nausea/Vomiting   


 


Pantoprazole Sodium  40 mg  06/07/18 10:00  06/08/18 11:00





  Protonix Inj  IVP   40 mg





  BID PRABHA   Administration


 


Pneumococcal Polyvalent Vaccine  0.5 ml  06/09/18 10:00  





  Pneumovax 23 Vaccine  IM  06/09/18 10:01  





  .ONCE ONE   


 


Sucralfate  1 gm  06/07/18 14:00  06/08/18 11:00





  Carafate Tab  PO   1 gm





  TID PRABHA   Administration














Past Psychiatric History





- Past Psychiatric History


Previous Treatment History: None


Pertinent Medical Hx (Current Medical&Sleep Prob, Allergies): 





 Allergies











Allergy/AdvReac Type Severity Reaction Status Date / Time


 


No Known Allergies Allergy   Verified 06/06/18 15:06








 





Unobtainable  06/06/18 











Review of Systems





- Neurological


Neurological: Tremor





- Psychiatric


Psychiatric: Abnormal Sleep Pattern, Anxiety, Difficulty Concentrating, 

Irritability.  absent: Hallucinations, Homicidal Ideation, Paranoia, Suicidal 

Ideation





Mental Status Examination





- Personal Presentation


Personal Presentation: Looks stated age





- Affect


Affect: Constricted





- Motor Activity


Motor Activity: Calm





- Reliability in Providing Information


Reliability in Providing Information: Fair





- Speech


Speech: Organized





- Mood


Mood: Anxious





- Formal Thought Process


Formal Thought Process: No Impairment





- Cognitive Functions


Orientation: Person, Place, Situation, Time


Sensorium: Alert


Attention/Concentration: Easily distracted


Abstract Thinking: [place]


Estimate of Intelligence: Average


Judgement: Intact, as evidence by: Insight regarding need for hospitalization


Memory: Recent intact, as evidence by: Ability to recall events of the day, 

Remote intact, as evidenced by: Abilit to recall sig. life events





- Risk


Risk: Withdrawal





- Strength & Assets Inventory


Strength & Assets Inventory: Cooperative





- Limitations


Limitations: Other





DSM 5 DX





- DSM 5


DSM 5 Diagnosis: 





Opioid withdrawal


Opioid use disorder, severe





- Recommended/Plan of Treatment


Treatment Recommendations and Plan of Treatment: 





Taper with methadone, start with 15 mg


Trazodone for insomnia


As needed medications


All risks, benefits and alternatives of the meds discussed,


 and the pt agreed and understood. 


Supportive therapy and psychoeducation


MI for abstinence


Encourage MAT


Refer to rehab or IOP, and self-help groups





32 min

## 2018-06-08 NOTE — CP.PCM.PN
Subjective





- Date & Time of Evaluation


Date of Evaluation: 06/08/18


Time of Evaluation: 14:46





- Subjective


Subjective: 


Nephrology Consultation Note:





Assessment: Stable


recurrent nausea/vomitting with gastritis duodenitis and candidiasis


Acute Kidney Injury (N17.9) likely due to pre-renal state


Diabetic chronic Kidney Disease (E11.22) 


Hypertensive Chronic Kidney Disease (I12.9)


Chronic Kidney Disease (N18.9) Stage ? with 422 mg proteinuria (R80.9) and 192 

mg albuminuria likely due to HTN (CKD evident by proteinuria and echogenic 

kidneys)


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 

methadone now


active smoker


Vit D def, hypokalemia





Plan


No acute need for renal replacement therapy at this time. 


Hypertension control with meds as ordered. Patient not on ACEI/ARB due to ALLISON (

add once ALLISON better). added lopressor, continue with norvasc


Monitor Input/Output, daily weights and renal function with basic metabolic 

panel


agree with IVF but changed to isotonic fluid as D5Ns due to decline in serum Na 

with 0.45% saline


started with weekly Vit D


supplemented KDUR





sent urine analysis, spot protein/creatinine and albumin/creatinine ratio, 25-

OH vitamin D, iPTH, phosphorus level HIV/Hep B and Hep C serology 





Dose meds/antibiotics for reduced GFR. Avoid fleets enema/magnesium based 

laxatives. Avoid nephrotoxins/NSAIDs/ iodinated contrast (unless needed 

emergently)


Glycemic control, smoking cessation


Further work up/management as per primary team





Thanks for allowing me to participate in care of your patient. Will follow 

patient with you. Please call if any Qs. d/w wife and team


Dr Sumit Stern


Office: [phone] Cell: [phone] Fax: [phone]





Chief Complaint; none now


reason for consult: ALLISON


HPI: Pt is a 58 M with hx of diabetes Mellitus (5-6 years), hypertension (5-6 

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 

takes methadone now presented with complaints of persistent nausea/vomitting 

for last few days with upper abdomen pain. renal consult for ALLISON. pt or wife 

not aware about kidney disease in past


Denies OTC/herbal meds has taken NSAIDs as alleve for few doses only. denies 

long term nsaids


No recent iodinated contrast exposure. No obvious episodes of low BP.


feels better now. had egd done 6/7/18





ROS: 


Cardiovascular: No chest pain. 


Pulmonary: No shortness of breath 


Gastrointestinal: improved abdominal pain better nausea. no vomiting. 


Genitourinary: No pain while urinating. Denies blood in urine. reported 

decreased urine output


All other negative





Physical Examination: 


General Appearance: Comfortable, in no acute respiratory distress, co-operative 

. 


Vitals reviewed and noted as below


Head; Atraumatic, normocephalic


ENT: no ulcers no thrush. Tongue is midline. Oropharynx: no rash or ulcers.


EYES: Pupils are equal, round and reactive to light accommodation. Eye muscles 

and extraocular movement intact. Sclera is anicteric. he is legally blind


Neck; supple no lymphadenopathy, no thyromegaly or bruit


Lungs: Normal respiratory rate/effort. Breath sounds bilateral equal and clear


Heart: Normal rate. s1s2 normal. No rub or gallop. 


Extremities: no edema. No varicose veins


Neurological: Patient is alert, awake and oriented to person, place and time. 

No focal deficit. Strength bilateral appropriate and equal


Skin: Warm and dry. Normal turgor. No rash. Palpitation: Normal elasticity for 

age


Abdomen: Abdomen is soft. Bowel sounds +. There is no abdominal tenderness, no 

guarding/rigidity no organomegaly


Psych: limited insight and normal affect/mood


MSK: no joint tenderness or swelling. Digits and nails normal, no deformity


: kidney or bladder not palpable





Labs/imaging reviewed.


Past medical history, past surgical history, family history, social history, 

allergy reviewed and noted as below


Family hx: no hx of CKD. Rest non-contributory 





renal imaging: b/l echogenic kidneys


UA: 2+ protein





Objective





- Vital Signs/Intake and Output


Vital Signs (last 24 hours): 


 











Temp Pulse Resp BP Pulse Ox


 


 98.4 F   80   20   169/86 H  95 


 


 06/08/18 00:00  06/08/18 00:00  06/08/18 00:00  06/08/18 11:00  06/08/18 00:00








Intake and Output: 


 











 06/08/18 06/08/18





 06:59 18:59


 


Intake Total 1690 


 


Output Total 700 


 


Balance 990 














- Medications


Medications: 


 Current Medications





Amlodipine Besylate (Norvasc)  10 mg PO DAILY [facility]


   Last Admin: 06/08/18 11:00 Dose:  10 mg


Dextrose (Dextrose 50% Inj)  0 ml IV STAT PRN; Protocol


   PRN Reason: Hypoglycemia Protocol


Dextrose (Glutose 15)  0 gm PO ONCE PRN; Protocol


   PRN Reason: Hypoglycemia Protocol


Enoxaparin Sodium (Lovenox)  40 mg SC DAILY [facility]


   Last Admin: 06/08/18 11:00 Dose:  40 mg


Ergocalciferol (Drisdol 50,000 Intl Units Cap)  1 cap PO Q7D [facility]


   Last Admin: 06/08/18 11:00 Dose:  1 cap


Fluconazole (Diflucan)  200 mg PO DAILY [facility]


   PRN Reason: Protocol


   Stop: 06/21/18 23:59


   Last Admin: 06/08/18 11:00 Dose:  200 mg


Glucagon (Glucagen Diagnostic Kit)  0 mg IM STAT PRN; Protocol


   PRN Reason: Hypoglycemia Protocol


Hydromorphone HCl (Dilaudid)  1 mg PO Q4H PRN


   PRN Reason: Pain, moderate (4-7)


   Last Admin: 06/07/18 17:47 Dose:  1 mg


Dextrose (Dextrose 5% In Water 1000 Ml)  1,000 mls @ 0 mls/hr IV .Q0M PRN; 

Protocol; Per Protocol


   PRN Reason: Hypoglycemia Protocol


Metronidazole (Flagyl)  500 mg in 100 mls @ 100 mls/hr IVPB Q8H [facility]


   PRN Reason: Protocol


   Last Admin: 06/08/18 13:00 Dose:  100 mls/hr


Dextrose/Sodium Chloride (Dextrose 5%/0.9% Ns 1000 Ml)  1,000 mls @ 100 mls/hr 

IV .Q10H [facility]


   Last Admin: 06/08/18 12:15 Dose:  100 mls/hr


Insulin Human Regular (Novolin R)  0 unit SC ACHS [facility]


   PRN Reason: Protocol


   Last Admin: 06/08/18 12:25 Dose:  2 unit


Metoprolol Tartrate (Lopressor)  25 mg PO BID [facility]


   Last Admin: 06/08/18 11:00 Dose:  25 mg


Ondansetron HCl (Zofran Inj)  4 mg IVP Q6 PRN


   PRN Reason: Nausea/Vomiting


Pantoprazole Sodium (Protonix Inj)  40 mg IVP BID [facility]


   Last Admin: 06/08/18 11:00 Dose:  40 mg


Pneumococcal Polyvalent Vaccine (Pneumovax 23 Vaccine)  0.5 ml IM .ONCE ONE


   Stop: 06/09/18 10:01


Sucralfate (Carafate Tab)  1 gm PO TID [facility]


   Last Admin: 06/08/18 13:35 Dose:  1 gm


Trazodone HCl (Desyrel)  50 mg PO HS PRN


   PRN Reason: Insomnia











- Labs


Labs: 


 





 06/08/18 07:11 





 06/08/18 07:11 





 











PT  13.5 SECONDS (9.7-12.2)  H  06/06/18  16:35    


 


INR  1.2   06/06/18  16:35

## 2018-06-08 NOTE — PN
DATE:  06/08/2018



LOCATION:  369, bed B.



SUBJECTIVE:  This is a 58-year-old male seen and examined in round

post-upper endoscopy and biopsy yesterday.  Pathology report is still

pending with hepatitis C viral antibodies positive.



The patient still has intermittent period of mild abdominal pain with

postprandial abdominal distention, but no reported active bleeding. 

Ultrasound of abdomen showed evidence of dilated common bile duct and MRCP

was suggested.



Today's lab showed leukocytosis of 11.7, potassium 3.4, BUN of 40,

creatinine 2.1, blood glucose level 216, calcium 8.1, total bilirubin 1.4,

albumin 3.1, and total protein 6.



The entire chart is reviewed, including but not limited to most recent lab

and radiology study results, current and previous medication list, current

and previous medical events is discussed with the staff as well as the

patient and wife at length yesterday and this morning.



PHYSICAL EXAMINATION:

GENERAL:  A 58-year-old male appeared to be awake and alert, slightly

cachectic.

VITAL SIGNS:  Afebrile with pulse of 82, respiratory rate 20 to 22, blood

pressure 144/70.

HEENT:  Showed pale and dry oral mucous membrane with bilateral icteric

sclerae.

LUNGS:  Few scattered crepitation.  Decreased air entry at bases.

HEART:  Positive S1 and S2.

ABDOMEN:  Soft with slight generalized tenderness.  No mass or

organomegaly.  No rebound tenderness or guarding.

EXTREMITIES:  With evidence of muscle wasting syndrome in the lower

extremities with mild edematous changes.  No clubbing or cyanosis.

NEUROLOGIC:  No new reported neurological deficits, sensory or motor.



The patient denied any actual chest pain, chills or fever or significant

shortness of breath.  No nausea or vomiting this morning.



IMPRESSION:

1.  Poorly-controlled diabetes mellitus with recurrent episodes of diabetic

gastroparesis.

2.  Poorly-controlled hypertension.

3.  Esophageal candidiasis with gastritis and episode of hematemesis

recently.

4.  Renal insufficiency.

5.  Electrolyte imbalance with hypocalcemia and hypokalemia with increased

BUN and creatinine.



SUGGESTION:

1.  Agree with your plan.

2.  Reglan IV.

3.  Rehydration.

4.  Further recommendation to follow and renal reevaluation to be

considered.







__________________________________________

Odalis Munoz MD



DD:  06/08/2018 10:50:28

DT:  06/08/2018 12:23:59

Job # [id number]

## 2018-06-08 NOTE — CP.PCM.PN
Subjective





- Date & Time of Evaluation


Date of Evaluation: 06/08/18


Time of Evaluation: 10:00





- Subjective


Subjective: 

















Progress note.











Attending: Dr. Martinez

















Pt seen and examined at bedside. No acute distress. No events overnight. No 

fevers, chills, vomiting, diarrhea. No complaints at this time.





Objective





- Vital Signs/Intake and Output


Vital Signs (last 24 hours): 


 











Temp Pulse Resp BP Pulse Ox


 


 98.4 F   80   20   149/76   95 


 


 06/08/18 00:00  06/08/18 00:00  06/08/18 00:00  06/08/18 00:00  06/08/18 00:00








Intake and Output: 


 











 06/08/18 06/08/18





 06:59 18:59


 


Intake Total 1690 


 


Output Total 700 


 


Balance 990 














- Medications


Medications: 


 Current Medications





Amlodipine Besylate (Norvasc)  10 mg PO DAILY [facility]


   Last Admin: 06/07/18 15:25 Dose:  10 mg


Dextrose (Dextrose 50% Inj)  0 ml IV STAT PRN; Protocol


   PRN Reason: Hypoglycemia Protocol


Dextrose (Glutose 15)  0 gm PO ONCE PRN; Protocol


   PRN Reason: Hypoglycemia Protocol


Enoxaparin Sodium (Lovenox)  40 mg SC DAILY [facility]


   Last Admin: 06/07/18 11:01 Dose:  Not Given


Fluconazole (Diflucan)  200 mg PO DAILY [facility]


   PRN Reason: Protocol


   Stop: 06/21/18 23:59


   Last Admin: 06/07/18 13:14 Dose:  200 mg


Glucagon (Glucagen Diagnostic Kit)  0 mg IM STAT PRN; Protocol


   PRN Reason: Hypoglycemia Protocol


Hydromorphone HCl (Dilaudid)  1 mg PO Q4H PRN


   PRN Reason: Pain, moderate (4-7)


   Last Admin: 06/07/18 17:47 Dose:  1 mg


Dextrose (Dextrose 5% In Water 1000 Ml)  1,000 mls @ 0 mls/hr IV .Q0M PRN; 

Protocol; Per Protocol


   PRN Reason: Hypoglycemia Protocol


Dextrose/Sodium Chloride (Dextrose 5%/0.45% Ns 1000 Ml)  1,000 mls @ 100 mls/hr 

IV .Q10H [facility]


   Last Admin: 06/07/18 21:10 Dose:  100 mls/hr


Metronidazole (Flagyl)  500 mg in 100 mls @ 100 mls/hr IVPB Q8H [facility]


   PRN Reason: Protocol


   Last Admin: 06/08/18 03:47 Dose:  100 mls/hr


Insulin Human Regular (Novolin R)  0 unit SC ACHS [facility]


   PRN Reason: Protocol


   Last Admin: 06/08/18 08:25 Dose:  2 unit


Metoprolol Tartrate (Lopressor)  25 mg PO BID [facility]


   Last Admin: 06/07/18 17:41 Dose:  25 mg


Ondansetron HCl (Zofran Inj)  4 mg IVP Q6 PRN


   PRN Reason: Nausea/Vomiting


Pantoprazole Sodium (Protonix Inj)  40 mg IVP BID [facility]


   Last Admin: 06/07/18 17:40 Dose:  40 mg


Pneumococcal Polyvalent Vaccine (Pneumovax 23 Vaccine)  0.5 ml IM .ONCE ONE


   Stop: 06/09/18 10:01


Sucralfate (Carafate Tab)  1 gm PO TID [facility]


   Last Admin: 06/07/18 17:42 Dose:  1 gm











- Labs


Labs: 


 





 06/08/18 07:11 





 06/08/18 07:11 





 











PT  13.5 SECONDS (9.7-12.2)  H  06/06/18  16:35    


 


INR  1.2   06/06/18  16:35    














- Constitutional


Appears: Non-toxic, No Acute Distress, Unkempt, Chronically Ill





- Head Exam


Head Exam: ATRAUMATIC, NORMAL INSPECTION, NORMOCEPHALIC





- ENT Exam


ENT Exam: Mucous Membranes Moist





- Neck Exam


Neck Exam: Full ROM, Normal Inspection





- Respiratory Exam


Respiratory Exam: NORMAL BREATHING PATTERN.  absent: Respiratory Distress





- Cardiovascular Exam


Cardiovascular Exam: +S1, +S2





- GI/Abdominal Exam


GI & Abdominal Exam: Soft, Normal Bowel Sounds.  absent: Tenderness





- Extremities Exam


Extremities Exam: Full ROM, Normal Inspection





- Neurological Exam


Neurological Exam: Alert, Awake, Oriented x3





- Psychiatric Exam


Psychiatric exam: Flat Affect





- Skin


Skin Exam: Dry, Intact, Normal Color, Warm





Assessment and Plan





- Assessment and Plan (Free Text)


Assessment: 











This is a 59 yo male with

















1. Candida esophagitis 














-NPO


-GI consult. Dr. Lee. recs appreciated.


-dilaudid 1 mg po q 4 hrs prn


-zofran prn


- D5 1/2  cc/hr


-lipase 560


-abdominal ultrasound shows pancreas not well visualized


-HIV pending


-diflucan 200


-flagyl q 8 hrs


-full liquid diet  














2. Vomiting











-zofran prn nausea


-r/o coffee ground emesis?


-protonix IV bid


-GI consult. recs appreciated. Dr. Lee


-add sucralfate 1 g po tid


-endoscopy performed


-biopsy pending


-erythematous mucosa in antrum


-duodenopathy 














3. Increased BUN/CR








-nephrology consult. Dr. Stanley. recs appreciated. Dr. Stern covering. 


-pt has ckd.

















4. Hx of HTN














-continue norvasc 10 mg po daily


-add lopressor

















5. hx of DM








-ISS


-hypoglycemia protocol


-check HGB a 1C


-check lipid panel




















6. GI/DVT ppx








-protonix iv bid


-scds











discussed with Dr. Martinez

## 2018-06-09 RX ADMIN — HUMAN INSULIN SCH UNIT: 100 INJECTION, SOLUTION SUBCUTANEOUS at 16:45

## 2018-06-09 RX ADMIN — DEXTROSE AND SODIUM CHLORIDE SCH MLS/HR: 5; 900 INJECTION, SOLUTION INTRAVENOUS at 03:24

## 2018-06-09 RX ADMIN — ENOXAPARIN SODIUM SCH MG: 40 INJECTION SUBCUTANEOUS at 10:03

## 2018-06-09 RX ADMIN — WATER SCH MLS/HR: 1 INJECTION INTRAMUSCULAR; INTRAVENOUS; SUBCUTANEOUS at 12:59

## 2018-06-09 RX ADMIN — WATER SCH MLS/HR: 1 INJECTION INTRAMUSCULAR; INTRAVENOUS; SUBCUTANEOUS at 20:19

## 2018-06-09 RX ADMIN — HUMAN INSULIN SCH UNIT: 100 INJECTION, SOLUTION SUBCUTANEOUS at 08:30

## 2018-06-09 RX ADMIN — DEXTROSE AND SODIUM CHLORIDE SCH: 5; 900 INJECTION, SOLUTION INTRAVENOUS at 08:42

## 2018-06-09 RX ADMIN — HUMAN INSULIN SCH: 100 INJECTION, SOLUTION SUBCUTANEOUS at 21:51

## 2018-06-09 RX ADMIN — DEXTROSE AND SODIUM CHLORIDE SCH MLS/HR: 5; 900 INJECTION, SOLUTION INTRAVENOUS at 18:03

## 2018-06-09 RX ADMIN — HUMAN INSULIN SCH UNIT: 100 INJECTION, SOLUTION SUBCUTANEOUS at 12:00

## 2018-06-09 RX ADMIN — WATER SCH MLS/HR: 1 INJECTION INTRAMUSCULAR; INTRAVENOUS; SUBCUTANEOUS at 03:35

## 2018-06-09 NOTE — CON
DATE:  06/06/2018



That is from Dr. Munoz to Dr. Adithya Martinez.



I was called for GI consultation by the admitting MD as well as the medical

staff in the floor.  The patient is seen and fully examined on 06/06/2018. 

A short consultation handwriting sheet left in the chart.  The entire chart

is reviewed including but not limited to the most recent lab and radiology

study results, current and the previous medication list, current and the

previous medical events, allergies to medication list as well as all the

available current and previous medical records.

Case discussed with the staff at length.



HISTORY OF PRESENT ILLNESS:  This is a 58-year-old male who was admitted to

the hospital through the emergency room due to recurrent episodes of severe

nausea and vomiting with generalized weakness and malaise with reported

elevated blood glucose level as outpatient and evidence of dehydration as

well as leukocytosis.



PAST MEDICAL HISTORY:  The patient denies any significant complaint of

shortness of breath, chest pain, palpitation, chills, fever, or evidence of

active bleeding.  Past medical history including mainly but not limited to,

1.  Hypertension.

2.  Diabetes mellitus.



FAMILY HISTORY:  Unknown.



CURRENT MEDICATIONS:  Post admission medication list was reviewed.



ALLERGIES TO MEDICATIONS: UNCLEAR.



After being admitted to the hospital, the patient was found to have

leukocytes of 13 with mildly elevated hemoglobin and hematocrit, most

likely secondary to dehydration.



The patient also was found to have increased BUN of 75, creatinine 2.8. 

Blood glucose level 195.



PHYSICAL EXAMINATION:

GENERAL:  A 58-year-old male, awake, alert, and oriented, complaining of

severe crampy abdominal pain, seen in the presence of his wife.

VITAL SIGNS:  Afebrile with pulse of 86, respiratory rate 20 to 22, blood

pressure of 192/104.

HEENT:  Showed pale oral mucoid membrane.  Nonicteric sclerae.

LYMPH NODES:  No lymphadenitis or lymphadenopathy.

LUNGS:  Clear.  Breathing sounds are present bilaterally but decreased at

bases.

HEART:  Positive S1 and S2.

ABDOMEN:  Soft with diffuse generalized tenderness.  No mass or

organomegaly.  No rebound tenderness or guarding.

RECTAL:  The patient refused.

EXTREMITIES:   Without any significant clubbing, cyanosis or edema.

NEUROLOGIC:  No reported new neurological deficits, sensory or motor.  No

reported new focal deficits.



It has be mentioned that the patient appears to be somewhat lethargic and

mild blindness was reported by the family and the primary MD.



IMPRESSION:

1.  Known history of poorly controlled diabetes mellitus.

2.  Re-exacerbation of peptic ulcer disease with possible diabetic

gastroparesis.

3.  To rule out gastric versus duodenal ulcers.  To rule out esophageal

candidiasis.

4.  Rule out early stage of acute pancreatitis.

5.  Multiple past medical history including but not limited to the nearly

poorly controlled hypertension, with diabetes mellitus and the possible

diabetic retinopathy.

6.  Renal insufficiency, most likely chronic.



SUGGESTIONS:

1.  Agree with your plan.

2.  Rehydration.

3.  Underlying poorly controlled hypertension.

4.  Reglan IV.

5.  Proton pump inhibitors IV.

6.  Endoscopic evaluation of the upper GI tract when the patient is more

stable clinically.

7.  Cancer markers including CEA, CA 19-9, PSA.

8.  Serum lipase, amylase level.

9.  Abdominal ultrasound if it was not done.

10.  Again endoscopic evaluation of the upper GI tract when the patient is

more stable clinically.  Further recommendation to follow.



Thank you for letting me participate in your patient's case management.  We

will follow up closely with you.





__________________________________________

Odalis Munoz MD





DD:  06/08/2018 21:01:22

DT:  06/08/2018 21:07:36

Job # [id number]

## 2018-06-09 NOTE — CP.PCM.PN
Subjective





- Date & Time of Evaluation


Date of Evaluation: 06/09/18


Time of Evaluation: 18:11





- Subjective


Subjective: 


renal follow up note





Nephrology Consultation Note:





Assessment: Stable


recurrent nausea/vomitting with gastritis duodenitis and candidiasis


Acute Kidney Injury (N17.9) likely due to pre-renal state


Diabetic chronic Kidney Disease (E11.22) 


Hypertensive Chronic Kidney Disease (I12.9)


Chronic Kidney Disease (N18.9) Stage ? with 422 mg proteinuria (R80.9) and 192 

mg albuminuria likely due to HTN (CKD evident by proteinuria and echogenic 

kidneys)


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 

methadone now


active smoker


Vit D def, hypokalemia





Plan


No acute need for renal replacement therapy at this time. 


Hypertension control with meds as ordered. 


Monitor Input/Output, daily weights and renal function with basic metabolic 

panel


agree with IVF


monitor lytes


no labs today











Physical Examination: 


General Appearance: Comfortable, in no acute respiratory distress, co-operative 

. 


Vitals reviewed and noted as below


Head; Atraumatic, normocephalic


ENT: no ulcers no thrush. Tongue is midline. Oropharynx: no rash or ulcers.


EYES: Pupils are equal, round and reactive to light accommodation. Eye muscles 

and extraocular movement intact. Sclera is anicteric. he is legally blind


Neck; supple no lymphadenopathy, no thyromegaly or bruit


Lungs: Normal respiratory rate/effort. Breath sounds bilateral equal and clear


Heart: Normal rate. s1s2 normal. No rub or gallop. 


Extremities: no edema. No varicose veins


Neurological: Patient is alert, awake and oriented to person, place and time. 

No focal deficit. Strength bilateral appropriate and equal


Skin: Warm and dry. 


Abdomen: Abdomen is soft. Bowel sounds +. There is no abdominal tenderness, no 

guarding/rigidity no organomegaly


Psych: limited insight and normal affect/mood


MSK: no joint tenderness or swelling.





Objective





- Vital Signs/Intake and Output


Vital Signs (last 24 hours): 


 











Temp Pulse Resp BP Pulse Ox


 


 98.5 F   73   20   154/83 H  98 


 


 06/09/18 16:00  06/09/18 16:00  06/09/18 16:00  06/09/18 17:39  06/09/18 16:00








Intake and Output: 


 











 06/09/18 06/09/18





 06:59 18:59


 


Intake Total 1800 1100


 


Output Total 750 700


 


Balance 1050 400














- Medications


Medications: 


 Current Medications





Amlodipine Besylate (Norvasc)  10 mg PO DAILY [facility]


   Last Admin: 06/09/18 10:05 Dose:  10 mg


Dextrose (Dextrose 50% Inj)  0 ml IV STAT PRN; Protocol


   PRN Reason: Hypoglycemia Protocol


Dextrose (Glutose 15)  0 gm PO ONCE PRN; Protocol


   PRN Reason: Hypoglycemia Protocol


Enoxaparin Sodium (Lovenox)  40 mg SC DAILY [facility]


   Last Admin: 06/09/18 10:03 Dose:  40 mg


Ergocalciferol (Drisdol 50,000 Intl Units Cap)  1 cap PO Q7D [facility]


   Last Admin: 06/08/18 11:00 Dose:  1 cap


Fluconazole (Diflucan)  200 mg PO DAILY [facility]


   PRN Reason: Protocol


   Stop: 06/21/18 23:59


   Last Admin: 06/09/18 10:05 Dose:  200 mg


Glucagon (Glucagen Diagnostic Kit)  0 mg IM STAT PRN; Protocol


   PRN Reason: Hypoglycemia Protocol


Hydromorphone HCl (Dilaudid)  1 mg PO Q4H PRN


   PRN Reason: Pain, moderate (4-7)


   Last Admin: 06/07/18 17:47 Dose:  1 mg


Dextrose (Dextrose 5% In Water 1000 Ml)  1,000 mls @ 0 mls/hr IV .Q0M PRN; 

Protocol; Per Protocol


   PRN Reason: Hypoglycemia Protocol


Metronidazole (Flagyl)  500 mg in 100 mls @ 100 mls/hr IVPB Q8H [facility]


   PRN Reason: Protocol


   Last Admin: 06/09/18 12:59 Dose:  100 mls/hr


Dextrose/Sodium Chloride (Dextrose 5%/0.9% Ns 1000 Ml)  1,000 mls @ 100 mls/hr 

IV .Q10H [facility]


   Last Admin: 06/09/18 18:03 Dose:  100 mls/hr


Insulin Human Regular (Novolin R)  0 unit SC ACHS [facility]


   PRN Reason: Protocol


   Last Admin: 06/09/18 16:45 Dose:  2 unit


Metoprolol Tartrate (Lopressor)  25 mg PO BID [facility]


   Last Admin: 06/09/18 17:39 Dose:  25 mg


Ondansetron HCl (Zofran Inj)  4 mg IVP Q6 PRN


   PRN Reason: Nausea/Vomiting


Pantoprazole Sodium (Protonix Inj)  40 mg IVP BID [facility]


   Last Admin: 06/09/18 17:39 Dose:  40 mg


Sucralfate (Carafate Tab)  1 gm PO TID [facility]


   Last Admin: 06/09/18 17:39 Dose:  1 gm


Trazodone HCl (Desyrel)  50 mg PO HS PRN


   PRN Reason: Insomnia


   Last Admin: 06/08/18 22:32 Dose:  50 mg











- Labs


Labs: 


 





 06/08/18 07:11 





 06/08/18 07:11 





 











PT  13.5 SECONDS (9.7-12.2)  H  06/06/18  16:35    


 


INR  1.2   06/06/18  16:35

## 2018-06-09 NOTE — PN
DATE:  06/09/2018



Location 369, bed B.



SUBJECTIVE:  This is a 58-year-old male seen and examined early in rounds

with his wife at bedside.



The entire chart is reviewed including but not limited to most recent lab

and radiology study results, current and the previous medication list,

current and the previous medical events.  Case discussed with the staff at

length.



No reported active bleeding or recurrent episode of nausea and vomiting

this morning.  The patient is post upper endoscopy with biopsy.  Biopsy

report showed negative for Helicobacter pylori infection.



The patient still has intermittent period of abdominal pain with dyspepsia

and subsequent drop of his oral intake.



No reported chest pain, significant shortness of breath, or palpitation. 

No chills or fever.



Most recent lab results done yesterday showed leukocytosis of 11.7 with

normal hemoglobin and hematocrit with mild increase of total bilirubin to

1.4, calcium 8.21, total protein 6, albumin 3.1.  Latest blood glucose

level 228.



PHYSICAL EXAMINATION:

GENERAL:  A 58-year-old male, afebrile, with pulse of 80, respiratory rate

20-22, blood pressure of 156/84.

HEENT:  Showed dry oral mucous membranes.  Slightly icteric sclerae

bilaterally.

LUNGS:  A few scattered mild crepitation.  Decreased air entry at bases.

HEART:  Positive S1 and S2.

ABDOMEN:  Soft with slight generalized tenderness.  No mass or

organomegaly.  No rebound tenderness or guarding.

EXTREMITIES:  Without significant rubbing or cyanosis but lower extremity

edematous changes.

NEUROLOGIC:  No reported new neurological deficits, sensory or motor.



IMPRESSION:

1.  Re-exacerbation of peptic ulcer disease with esophageal candidiasis and

evidence of diabetic gastroparesis.

2.  Poorly controlled diabetes mellitus.

3.  Poorly controlled hypertension.

4.  Mild renal insufficiency.

5.  Electrolyte imbalance secondary to above.



SUGGESTIONS:

1.  Continue current management.

2.  Creon 36,000 units p.o. one twice a day with meals.

3.  Repeat stool for occult blood.

4.  Follow up on cancer markers.

5.  Further recommendation to follow.









__________________________________________

Odalis Munoz MD



DD:  06/09/2018 7:09:56

DT:  06/09/2018 7:12:00

Job # [id number]

## 2018-06-10 RX ADMIN — WATER SCH MLS/HR: 1 INJECTION INTRAMUSCULAR; INTRAVENOUS; SUBCUTANEOUS at 12:30

## 2018-06-10 RX ADMIN — DEXTROSE AND SODIUM CHLORIDE SCH MLS/HR: 5; 900 INJECTION, SOLUTION INTRAVENOUS at 16:54

## 2018-06-10 RX ADMIN — DEXTROSE AND SODIUM CHLORIDE SCH: 5; 900 INJECTION, SOLUTION INTRAVENOUS at 14:15

## 2018-06-10 RX ADMIN — DEXTROSE AND SODIUM CHLORIDE SCH MLS/HR: 5; 900 INJECTION, SOLUTION INTRAVENOUS at 03:49

## 2018-06-10 RX ADMIN — HUMAN INSULIN SCH: 100 INJECTION, SOLUTION SUBCUTANEOUS at 21:42

## 2018-06-10 RX ADMIN — HUMAN INSULIN SCH UNIT: 100 INJECTION, SOLUTION SUBCUTANEOUS at 08:30

## 2018-06-10 RX ADMIN — WATER SCH MLS/HR: 1 INJECTION INTRAMUSCULAR; INTRAVENOUS; SUBCUTANEOUS at 20:40

## 2018-06-10 RX ADMIN — WATER SCH MLS/HR: 1 INJECTION INTRAMUSCULAR; INTRAVENOUS; SUBCUTANEOUS at 03:41

## 2018-06-10 RX ADMIN — ENOXAPARIN SODIUM SCH MG: 40 INJECTION SUBCUTANEOUS at 10:06

## 2018-06-10 RX ADMIN — HUMAN INSULIN SCH UNIT: 100 INJECTION, SOLUTION SUBCUTANEOUS at 16:54

## 2018-06-10 RX ADMIN — HUMAN INSULIN SCH UNIT: 100 INJECTION, SOLUTION SUBCUTANEOUS at 12:30

## 2018-06-10 NOTE — PN
DATE:  06/10/2018



LOCATION:  369, bed B.



SUBJECTIVE:  This is a 58-year-old male seen and examined in rounds without

significant clinical changes or reported active bleeding, but with recent

change of bowel movement habits as per the wife's statement.



It has to be mentioned that the case discussed at length with Dr. Martinez.



The entire chart is reviewed including but not limited to the most recent

lab and radiology study results, current and the previous medication list,

current and the previous medical events, and today's lab showed blood

glucose level of 241, and the patient is still having elevated BUN and

creatinine with low albumin and low total protein with negative hepatitis

profile.



PHYSICAL EXAMINATION:

GENERAL:  A 58-year-old male, awake, alert.

VITAL SIGNS:  Afebrile with pulse of 70, respiratory rate 20-22, blood

pressure of 160/82.

HEENT:  Showed pale, dry oral mucous membrane.  Nonicteric sclera.

LUNGS:  Few scattered crepitation.  Decreased air entry at bases.

HEART:  Positive S1 and S2.

ABDOMEN:  Soft with mild generalized tenderness.  No mass or organomegaly. 

No rebound tenderness or guarding.

EXTREMITIES:  Without significant clubbing, cyanosis or edema.

NEUROLOGICAL:  No reported new neurological deficits, sensory or motor.



IMPRESSION:

1.  Peptic ulcer disease with evidence of esophageal candidiasis and

gastritis with evidence of diabetic gastroparesis.

2.  Poorly-controlled diabetes mellitus.

3.  Poorly-controlled hypertension.

4.  Renal insufficiency.

5  Electrolyte imbalance.

6.  Malnutrition with hypoalbuminemia, hypoproteinemia.

7.  Change of bowel movement habit, no clear etiology.



SUGGESTIONS:

1.  Agree with your plan.

2.  According to the family statement, the patient never had colonoscopy

_____ for colonoscopy and due to the recent change of his bowel movement.

3.  This case again discussed at length with Dr. Martinez.







__________________________________________

Odalis Munoz MD



DD:  06/10/2018 9:00:20

DT:  06/10/2018 9:02:35

Job # [id number]

## 2018-06-11 VITALS
SYSTOLIC BLOOD PRESSURE: 161 MMHG | TEMPERATURE: 98.1 F | HEART RATE: 78 BPM | OXYGEN SATURATION: 98 % | DIASTOLIC BLOOD PRESSURE: 84 MMHG

## 2018-06-11 LAB
ALBUMIN SERPL-MCNC: 2.9 G/DL (ref 3.5–5)
ALBUMIN/GLOB SERPL: 1.1 {RATIO} (ref 1–2.1)
ALT SERPL-CCNC: 46 U/L (ref 21–72)
AST SERPL-CCNC: 66 U/L (ref 17–59)
BASOPHILS # BLD AUTO: 0 K/UL (ref 0–0.2)
BASOPHILS NFR BLD: 0.3 % (ref 0–2)
BUN SERPL-MCNC: 11 MG/DL (ref 9–20)
CALCIUM SERPL-MCNC: 7.3 MG/DL (ref 8.6–10.4)
EOSINOPHIL # BLD AUTO: 0.1 K/UL (ref 0–0.7)
EOSINOPHIL NFR BLD: 1.1 % (ref 0–4)
ERYTHROCYTE [DISTWIDTH] IN BLOOD BY AUTOMATED COUNT: 12.6 % (ref 11.5–14.5)
GFR NON-AFRICAN AMERICAN: 41
HGB BLD-MCNC: 12.7 G/DL (ref 12–18)
LYMPHOCYTES # BLD AUTO: 2.8 K/UL (ref 1–4.3)
LYMPHOCYTES NFR BLD AUTO: 32.3 % (ref 20–40)
MCH RBC QN AUTO: 27.8 PG (ref 27–31)
MCHC RBC AUTO-ENTMCNC: 33.2 G/DL (ref 33–37)
MCV RBC AUTO: 83.6 FL (ref 80–94)
MONOCYTES # BLD: 0.6 K/UL (ref 0–0.8)
MONOCYTES NFR BLD: 7.3 % (ref 0–10)
NEUTROPHILS # BLD: 5.2 K/UL (ref 1.8–7)
NEUTROPHILS NFR BLD AUTO: 59 % (ref 50–75)
NRBC BLD AUTO-RTO: 0 % (ref 0–2)
PLATELET # BLD: 197 K/UL (ref 130–400)
PMV BLD AUTO: 10 FL (ref 7.2–11.7)
RBC # BLD AUTO: 4.57 MIL/UL (ref 4.4–5.9)
WBC # BLD AUTO: 8.8 K/UL (ref 4.8–10.8)

## 2018-06-11 RX ADMIN — DEXTROSE AND SODIUM CHLORIDE SCH: 5; 900 INJECTION, SOLUTION INTRAVENOUS at 10:32

## 2018-06-11 RX ADMIN — DEXTROSE AND SODIUM CHLORIDE SCH MLS/HR: 5; 900 INJECTION, SOLUTION INTRAVENOUS at 03:13

## 2018-06-11 RX ADMIN — WATER SCH MLS/HR: 1 INJECTION INTRAMUSCULAR; INTRAVENOUS; SUBCUTANEOUS at 03:08

## 2018-06-11 RX ADMIN — WATER SCH MLS/HR: 1 INJECTION INTRAMUSCULAR; INTRAVENOUS; SUBCUTANEOUS at 12:10

## 2018-06-11 RX ADMIN — HUMAN INSULIN SCH UNIT: 100 INJECTION, SOLUTION SUBCUTANEOUS at 08:20

## 2018-06-11 RX ADMIN — ENOXAPARIN SODIUM SCH MG: 40 INJECTION SUBCUTANEOUS at 10:31

## 2018-06-11 RX ADMIN — HUMAN INSULIN SCH UNIT: 100 INJECTION, SOLUTION SUBCUTANEOUS at 12:10

## 2018-06-11 NOTE — PN
DATE:  06/11/2018



LOCATION:  369, bed B.



SUBJECTIVE:  This is a 58-year-old male seen and examined in the presence

of his wife without reported active bleeding, nausea, or vomiting or

significant complaint of shortness of breath.



Again, the patient had been refusing colonoscopy.  His wife was at bedside

and was informant.



The entire chart is reviewed including but not limited to the most recent

lab and radiology study results, current and the previous medication list,

current and the previous medical events.  Case discussed with the staff at

length.



Today's lab showed normal CBC, potassium 3, creatinine 1.7, blood glucose

level 298, AST 66, albumin 2.9, total protein 5.5.



PHYSICAL EXAMINATION:

GENERAL:  A 58-year-old male tolerated oral intake.

VITAL SIGNS:  Afebrile with pulse of 74, respiratory rate 20 to 22, and

blood pressure of 158/80.

HEENT:  Showed pale, dry oral mucous membrane.  Nonicteric sclera.

LUNGS:  Few scattered mild crepitation.  Breathing sounds present

bilaterally.

HEART:  Positive S1 and S2.

ABDOMEN:  Soft with mild generalized tenderness.  No mass or organomegaly. 

No rebound tenderness or guarding.

EXTREMITIES:  Mild lower extremity edematous changes.  No clubbing or

cyanosis.

NEUROLOGIC:  No reported new neurological deficit, sensory or motor.



IMPRESSION:

1.  Re-exacerbation of peptic ulcer disease.

2.  Evidence of esophageal candidiasis with gastritis and diabetic

gastroparesis.

3.  Poorly-controlled hypertension.

4.  Diabetes mellitus.

5.  Electrolyte imbalance with hypokalemia.

6.  Malnutrition with hypoalbuminemia.

7.  Recent history of renal insufficiency.



SUGGESTION:

1.  Continue current management.

2.  Follow up with cancer markers.

3.  Guaiac all the stool daily x3.

4.  Further recommendation to follow.







__________________________________________

Odalis Munoz MD



DD:  06/11/2018 12:39:18

DT:  06/11/2018 14:12:00

Job # [id number]

## 2018-06-11 NOTE — PN
DATE:  06/10/2018



The patient was getting bedrest, IV antibiotics, supportive care.   Monitor

blood sugar.  Colonoscopy tomorrow.





__________________________________________

Adithya Martinez MD





DD:  06/10/2018 18:18:22

DT:  06/10/2018 19:54:02

Job # [id number]

## 2018-06-11 NOTE — CP.PCM.PN
Subjective





- Date & Time of Evaluation


Date of Evaluation: 06/11/18


Time of Evaluation: 11:40





- Subjective


Subjective: 

















Progress note.








Attending: Juan Heck seen and examined at bedside. No acute distress. No events overnight. No 

fevers, chills, vomiting, diarrhea. 





Objective





- Vital Signs/Intake and Output


Vital Signs (last 24 hours): 


 











Temp Pulse Resp BP Pulse Ox


 


 98.1 F   78   20   161/84 H  98 


 


 06/11/18 07:49  06/11/18 07:49  06/11/18 07:49  06/11/18 10:30  06/11/18 07:49








Intake and Output: 


 











 06/11/18 06/11/18





 06:59 18:59


 


Intake Total 2240 


 


Output Total 900 


 


Balance 1340 














- Medications


Medications: 


 Current Medications





Amlodipine Besylate (Norvasc)  10 mg PO DAILY [facility]


   Last Admin: 06/11/18 10:30 Dose:  10 mg


Dextrose (Dextrose 50% Inj)  0 ml IV STAT PRN; Protocol


   PRN Reason: Hypoglycemia Protocol


Dextrose (Glutose 15)  0 gm PO ONCE PRN; Protocol


   PRN Reason: Hypoglycemia Protocol


Enoxaparin Sodium (Lovenox)  40 mg SC DAILY [facility]


   Last Admin: 06/11/18 10:31 Dose:  40 mg


Ergocalciferol (Drisdol 50,000 Intl Units Cap)  1 cap PO Q7D [facility]


   Last Admin: 06/08/18 11:00 Dose:  1 cap


Fluconazole (Diflucan)  200 mg PO DAILY [facility]


   PRN Reason: Protocol


   Stop: 06/21/18 23:59


   Last Admin: 06/11/18 10:29 Dose:  200 mg


Glucagon (Glucagen Diagnostic Kit)  0 mg IM STAT PRN; Protocol


   PRN Reason: Hypoglycemia Protocol


Metronidazole (Flagyl)  500 mg in 100 mls @ 100 mls/hr IVPB Q8H [facility]


   PRN Reason: Protocol


   Last Admin: 06/11/18 03:08 Dose:  100 mls/hr


Dextrose/Sodium Chloride (Dextrose 5%/0.9% Ns 1000 Ml)  1,000 mls @ 100 mls/hr 

IV .Q10H [facility]


   Last Admin: 06/11/18 10:32 Dose:  Not Given


Insulin Human Regular (Novolin R)  0 unit SC ACHS [facility]


   PRN Reason: Protocol


   Last Admin: 06/11/18 08:20 Dose:  1 unit


Metoprolol Tartrate (Lopressor)  25 mg PO BID [facility]


   Last Admin: 06/11/18 10:30 Dose:  25 mg


Ondansetron HCl (Zofran Inj)  4 mg IVP Q6 PRN


   PRN Reason: Nausea/Vomiting


Pantoprazole Sodium (Protonix Inj)  40 mg IVP BID [facility]


   Last Admin: 06/11/18 10:31 Dose:  40 mg


Sucralfate (Carafate Tab)  1 gm PO TID [facility]


   Last Admin: 06/11/18 10:28 Dose:  1 gm


Trazodone HCl (Desyrel)  50 mg PO HS PRN


   PRN Reason: Insomnia


   Last Admin: 06/10/18 21:42 Dose:  50 mg











- Labs


Labs: 


 





 06/08/18 07:11 





 06/08/18 07:11 





 











PT  13.5 SECONDS (9.7-12.2)  H  06/06/18  16:35    


 


INR  1.2   06/06/18  16:35    














- Constitutional


Appears: Chronically Ill





- Head Exam


Head Exam: ATRAUMATIC, NORMAL INSPECTION, NORMOCEPHALIC





- Eye Exam


Eye Exam: EOMI





- ENT Exam


ENT Exam: Mucous Membranes Moist





- Neck Exam


Neck Exam: Full ROM, Normal Inspection





- Respiratory Exam


Respiratory Exam: NORMAL BREATHING PATTERN.  absent: Respiratory Distress





- Cardiovascular Exam


Cardiovascular Exam: +S1, +S2





- GI/Abdominal Exam


GI & Abdominal Exam: Soft, Normal Bowel Sounds.  absent: Tenderness





- Extremities Exam


Extremities Exam: Full ROM, Normal Inspection





- Back Exam


Back Exam: NORMAL INSPECTION





- Neurological Exam


Neurological Exam: Alert, Awake, Oriented x3





- Psychiatric Exam


Psychiatric exam: Normal Affect, Normal Mood





- Skin


Skin Exam: Dry, Intact, Normal Color, Warm





Assessment and Plan





- Assessment and Plan (Free Text)


Assessment: 














This is a 59 yo male with

















1. Candida esophagitis 














-GI consult. Dr. Lee. recs appreciated.


-dilaudid 1 mg po q 4 hrs prn


-zofran prn


-lipase 560


-abdominal ultrasound shows pancreas not well visualized


-HIV negative 


-diflucan 200


-flagyl q 8 hrs


-altered GI/hepatic diet 














2. Vomiting











-zofran prn nausea


-no more vomiting 


-protonix IV bid


-GI consult. recs appreciated. Dr. Lee


-add sucralfate 1 g po tid


-endoscopy performed


-biopsy pending


-erythematous mucosa in antrum


-duodenopathy 














3. Increased BUN/CR








-nephrology consult. Dr. Stanley. recs appreciated. Dr. Stern covering. 


-pt has ckd.

















4. Hx of HTN














-continue norvasc 10 mg po daily


-add lopressor

















5. hx of DM








-ISS


-hypoglycemia protocol


-check HGB a 1C


-check lipid panel




















6. GI/DVT ppx








-protonix iv bid


-scds











discussed with Dr. Martinez

## 2018-06-14 ENCOUNTER — HOSPITAL ENCOUNTER (EMERGENCY)
Dept: HOSPITAL 31 - C.ER | Age: 58
Discharge: HOME | End: 2018-06-14
Payer: COMMERCIAL

## 2018-06-14 VITALS — DIASTOLIC BLOOD PRESSURE: 92 MMHG | TEMPERATURE: 98.7 F | HEART RATE: 85 BPM | SYSTOLIC BLOOD PRESSURE: 164 MMHG

## 2018-06-14 VITALS — OXYGEN SATURATION: 98 %

## 2018-06-14 VITALS — BODY MASS INDEX: 26.2 KG/M2

## 2018-06-14 VITALS — RESPIRATION RATE: 16 BRPM

## 2018-06-14 DIAGNOSIS — R56.9: Primary | ICD-10-CM

## 2018-06-14 LAB
ALBUMIN SERPL-MCNC: 3.9 G/DL (ref 3.5–5)
ALBUMIN/GLOB SERPL: 1.2 {RATIO} (ref 1–2.1)
ALT SERPL-CCNC: 59 U/L (ref 21–72)
AST SERPL-CCNC: 35 U/L (ref 17–59)
BASOPHILS # BLD AUTO: 0.1 K/UL (ref 0–0.2)
BASOPHILS NFR BLD: 0.5 % (ref 0–2)
BILIRUB UR-MCNC: NEGATIVE MG/DL
BUN SERPL-MCNC: 16 MG/DL (ref 9–20)
CALCIUM SERPL-MCNC: 8.7 MG/DL (ref 8.6–10.4)
EOSINOPHIL # BLD AUTO: 0.1 K/UL (ref 0–0.7)
EOSINOPHIL NFR BLD: 0.5 % (ref 0–4)
ERYTHROCYTE [DISTWIDTH] IN BLOOD BY AUTOMATED COUNT: 13.4 % (ref 11.5–14.5)
GFR NON-AFRICAN AMERICAN: 36
GLUCOSE UR STRIP-MCNC: NORMAL MG/DL
HGB BLD-MCNC: 13.9 G/DL (ref 12–18)
LEUKOCYTE ESTERASE UR-ACNC: (no result) LEU/UL
LYMPHOCYTES # BLD AUTO: 3.1 K/UL (ref 1–4.3)
LYMPHOCYTES NFR BLD AUTO: 26.2 % (ref 20–40)
MCH RBC QN AUTO: 27.4 PG (ref 27–31)
MCHC RBC AUTO-ENTMCNC: 32.7 G/DL (ref 33–37)
MCV RBC AUTO: 83.6 FL (ref 80–94)
MONOCYTES # BLD: 0.7 K/UL (ref 0–0.8)
MONOCYTES NFR BLD: 5.8 % (ref 0–10)
NEUTROPHILS # BLD: 7.9 K/UL (ref 1.8–7)
NEUTROPHILS NFR BLD AUTO: 67 % (ref 50–75)
NRBC BLD AUTO-RTO: 0.1 % (ref 0–2)
PH UR STRIP: 6 [PH] (ref 5–8)
PLATELET # BLD: 284 K/UL (ref 130–400)
PMV BLD AUTO: 9.5 FL (ref 7.2–11.7)
PROT UR STRIP-MCNC: (no result) MG/DL
RBC # BLD AUTO: 5.08 MIL/UL (ref 4.4–5.9)
RBC # UR STRIP: NEGATIVE /UL
SP GR UR STRIP: 1.01 (ref 1–1.03)
UROBILINOGEN UR-MCNC: NORMAL MG/DL (ref 0.2–1)
WBC # BLD AUTO: 11.8 K/UL (ref 4.8–10.8)

## 2018-06-14 PROCEDURE — 99285 EMERGENCY DEPT VISIT HI MDM: CPT

## 2018-06-14 PROCEDURE — 81001 URINALYSIS AUTO W/SCOPE: CPT

## 2018-06-14 PROCEDURE — 85025 COMPLETE CBC W/AUTO DIFF WBC: CPT

## 2018-06-14 PROCEDURE — 82948 REAGENT STRIP/BLOOD GLUCOSE: CPT

## 2018-06-14 PROCEDURE — 82550 ASSAY OF CK (CPK): CPT

## 2018-06-14 PROCEDURE — 80053 COMPREHEN METABOLIC PANEL: CPT

## 2018-06-14 PROCEDURE — 84484 ASSAY OF TROPONIN QUANT: CPT

## 2018-06-14 PROCEDURE — 93005 ELECTROCARDIOGRAM TRACING: CPT

## 2018-06-14 PROCEDURE — 70450 CT HEAD/BRAIN W/O DYE: CPT

## 2018-06-14 NOTE — C.PDOC
History Of Present Illness


58 year old male brought in via EMS after having a seizure. Patient had a 

"[employer]" wine cooler and marijuana when he became lightheaded, fell, 

and had a seizure as per wife. Patient became post ictal then had a recurrent 

seizure which prompted EMS call. On arrival patient is aaox3 and states he 

feels better. Denies Hx of seizures, tongue biting, or incontinence.


Time Seen by Provider: 18 18:55


Chief Complaint (Nursing): Seizure


History Per: Patient


History/Exam Limitations: no limitations


Recent Seizure Activity Began: Just Before Arrival


Number Of Seizures: Multiple


Length Of Seizures (Duration): Unknown


Quality Of Seizure: Generalized


Precipitating Factor(s): Recent Alcohol Ingestion, Recent Street Drugs


Post-ictal Period: Yes


Recent travel outside of the [place]: No





Past Medical History


Reviewed: Historical Data, Nursing Documentation, Vital Signs


Vital Signs: 


 Last Vital Signs











Temp  98.7 F   18 21:52


 


Pulse  85   18 21:52


 


Resp  16   18 21:52


 


BP  164/92 H  18 21:52


 


Pulse Ox  98   18 22:00














- Medical History


PMH: Diabetes, HTN





- [facility] Procedures








EXCISION OF STOMACH, ENDO, DIAGN (18)








Family History: States: Unknown Family Hx





- Social History


Hx Alcohol Use: No


Hx Substance Use: Yes





Review Of Systems


Except As Marked, All Systems Reviewed And Found Negative.


Gastrointestinal: Negative for: Nausea, Vomiting


Neurological: Positive for: Seizures





Physical Exam





- Physical Exam


Appears: Non-toxic


Skin: Normal Color, Warm, Dry


Head: Atraumatic, Normacephalic


Eye(s): bilateral: Normal Inspection, PERRL, EOMI


Oral Mucosa: Moist


Tongue: Normal Appearing, No Bite


Neck: Normal, No Midline Cervical Tenderness, No Paracervical Tenderness, Supple


Chest: Symmetrical, No Tenderness


Cardiovascular: Rhythm Regular


Respiratory: Normal Breath Sounds, No Rales, No Rhonchi, No Wheezing


Gastrointestinal/Abdominal: Soft, No Tenderness


Back: No Vertebral Tenderness, No Paraspinal Tenderness


Extremity: Normal ROM (x4)


Neurological/Psych: Oriented x3, Normal Speech


Gait: Steady





ED Course And Treatment





- Laboratory Results


Result Diagrams: 


 18 19:10





 18 19:10


ECG: Interpreted By Me, Viewed By Me


ECG Rhythm: Sinus Rhythm


ECG Interpretation: Normal, No Changes From Prior


Interpretation Of ECG: Normal intervals, normal axis, T wave inversions at avf, 

v4, v5, v6


Rate From EC


O2 Sat by Pulse Oximetry: 98 (Room air)


Pulse Ox Interpretation: Normal





- CT Scan/US


  ** CT Head


Other Rad Studies (CT/US): Read By Radiologist, Radiology Report Reviewed


CT/US Interpretation: EXAM:  CT Head Without Intravenous Contrast.  CLINICAL 

HISTORY:  58 years old, male; Condition or disease; Other: Seizure.  TECHNIQUE:

  Axial computed tomography images of the head/brain without intravenous 

contrast. All CT scans at.  this facility use one or more dose reduction 

techniques, viz.: automated exposure control; ma/kV.  adjustment per patient 

size (including targeted exams where dose is matched to indication; i.e. head);

.  or iterative reconstruction technique.  COMPARISON:  No relevant prior 

studies available.  FINDINGS:  Brain: Minimal volume loss is seen in keeping 

with age. Minimal decrease in attenuation of the.  periventricular white matter 

likely related to small vessel ischemic change. The brain otherwise with.  

normal gray-white matter differentiation, demonstrating no edema, mass effect, 

acute hemorrhage, or.  focal mass.  Ventricles: Unremarkable. No 

ventriculomegaly.  Bones/joints: Unremarkable. No acute fracture.  Soft tissues

: Unremarkable.  Sinuses: Unremarkable as visualized. No acute sinusitis.  

Mastoid air cells: Unremarkable as visualized. No mastoid effusion.  IMPRESSION

:  No acute intracranial findings are seen. No focal intracranial lesion is 

seen to serve as a nidus for.  seizure activity.





Medical Decision Making


Medical Decision Making: 





Assessment: Seizures





Plan:


* CT head


* EKG


* Blood work


* Urinalysis


* IV fluids





Case discussed with Dr. Reynoso, he was advised that patient does not wish to stay

, as per Dr. Reynoso patient is okay for discharge home, advised he needs workup 

for new onset seizure, and states he does not need to be started on antiseizure 

medications at this time. This information was relayed to patient and patient's 

wife at bedside. Patient was told that recurrent seizure cannot be predicted at 

this time. 











Disposition


Discussed With : Jeremy Reynoso


Doctor Will See Patient In The: Office


Counseled Patient/Family Regarding: Studies Performed, Diagnosis





- Disposition


Referrals: 


Jeremy Reynoso MD [Staff Provider] - 


Disposition: HOME/ ROUTINE


Disposition Time: 21:59


Condition: STABLE


Additional Instructions: 


follow up with Dr. Reynoso in 2 days


call to make an appointment


you do not wish to stay in hospital and


state you would like to work up seizure as outpatient


return to ER at any time. 


Instructions:  Seizures


Forms:  [facility] (English), General Discharge Instructions





- Clinical Impression


Clinical Impression: 


 Seizure








- Scribe Statement


The provider has reviewed the documentation as recorded by the Scribe





Sanya Hussein





All medical record entries made by the Scribe were at my direction and 

personally dictated by me. I have reviewed the chart and agree that the record 

accurately reflects my personal performance of the history, physical exam, 

medical decision making, and the department course for this patient. I have 

also personally directed, reviewed, and agree with the discharge instructions 

and disposition.

## 2018-06-15 NOTE — CT
PROCEDURE:  CT HEAD WITHOUT CONTRAST.



HISTORY:

Seizure 



COMPARISON:

None available. 



TECHNIQUE:

Axial computed tomography images were obtained through the head/brain 

without intravenous contrast.  



Radiation dose:



Total exam DLP = 947.89 mGy-cm.



This CT exam was performed using one or more of the following dose 

reduction techniques: Automated exposure control, adjustment of the 

mA and/or kV according to patient size, and/or use of iterative 

reconstruction technique.



FINDINGS:



HEMORRHAGE:

No intracranial hemorrhage. 



BRAIN:

There are mild chronic microangiopathic changes.  There is no mass, 

mass effect or abnormal extra-axial fluid collection.



VENTRICLES:

There is mild age-related global parenchymal volume loss and 

proportionate enlargement of the ventricles and cortical sulci. 



CALVARIUM:

The skull base and calvarium are normal.



PARANASAL SINUSES:

Predominantly clear.



MASTOID AIR CELLS:

Predominantly clear.



OTHER FINDINGS:

None.



IMPRESSION:

No acute intracranial abnormality.  



Mild chronic microangiopathic changes and mild age-related global 

parenchymal volume loss. 



A preliminary report was provided by [employer] services.

## 2018-06-17 NOTE — CARD
--------------- APPROVED REPORT --------------





EKG Measurement

Heart [id number]

NM [id number]

[id number]

[id number]

[id number]



<Conclusion>

Normal sinus rhythm

T wave abnormality, consider inferolateral ischemia

Abnormal ECG

## 2018-06-29 ENCOUNTER — HOSPITAL ENCOUNTER (INPATIENT)
Dept: HOSPITAL 31 - C.ER | Age: 58
LOS: 4 days | Discharge: HOME | DRG: 639 | End: 2018-07-03
Attending: INTERNAL MEDICINE | Admitting: INTERNAL MEDICINE
Payer: COMMERCIAL

## 2018-06-29 VITALS — BODY MASS INDEX: 26.2 KG/M2

## 2018-06-29 DIAGNOSIS — F17.210: ICD-10-CM

## 2018-06-29 DIAGNOSIS — E11.649: Primary | ICD-10-CM

## 2018-06-29 DIAGNOSIS — Z79.4: ICD-10-CM

## 2018-06-29 DIAGNOSIS — H40.9: ICD-10-CM

## 2018-06-29 DIAGNOSIS — H54.3: ICD-10-CM

## 2018-06-29 DIAGNOSIS — E87.6: ICD-10-CM

## 2018-06-29 DIAGNOSIS — I12.9: ICD-10-CM

## 2018-06-29 DIAGNOSIS — N18.9: ICD-10-CM

## 2018-06-29 DIAGNOSIS — R56.9: ICD-10-CM

## 2018-06-29 DIAGNOSIS — E11.22: ICD-10-CM

## 2018-06-29 DIAGNOSIS — K21.9: ICD-10-CM

## 2018-06-30 LAB
ALBUMIN SERPL-MCNC: 3 G/DL (ref 3.5–5)
ALBUMIN SERPL-MCNC: 4 G/DL (ref 3.5–5)
ALBUMIN/GLOB SERPL: 1.2 {RATIO} (ref 1–2.1)
ALBUMIN/GLOB SERPL: 1.2 {RATIO} (ref 1–2.1)
ALT SERPL-CCNC: 42 U/L (ref 21–72)
ALT SERPL-CCNC: 46 U/L (ref 21–72)
AST SERPL-CCNC: 36 U/L (ref 17–59)
AST SERPL-CCNC: 53 U/L (ref 17–59)
BASOPHILS # BLD AUTO: 0.1 K/UL (ref 0–0.2)
BASOPHILS NFR BLD: 0.5 % (ref 0–2)
BILIRUB UR-MCNC: NEGATIVE MG/DL
BUN SERPL-MCNC: 38 MG/DL (ref 9–20)
BUN SERPL-MCNC: 43 MG/DL (ref 9–20)
BUN SERPL-MCNC: 47 MG/DL (ref 9–20)
CALCIUM SERPL-MCNC: 7.6 MG/DL (ref 8.6–10.4)
CALCIUM SERPL-MCNC: 8.3 MG/DL (ref 8.6–10.4)
CALCIUM SERPL-MCNC: 8.5 MG/DL (ref 8.6–10.4)
EOSINOPHIL # BLD AUTO: 0 K/UL (ref 0–0.7)
EOSINOPHIL NFR BLD: 0.1 % (ref 0–4)
ERYTHROCYTE [DISTWIDTH] IN BLOOD BY AUTOMATED COUNT: 12.8 % (ref 11.5–14.5)
GFR NON-AFRICAN AMERICAN: 28
GFR NON-AFRICAN AMERICAN: 32
GFR NON-AFRICAN AMERICAN: 34
GLUCOSE UR STRIP-MCNC: (no result) MG/DL
HGB BLD-MCNC: 16.2 G/DL (ref 12–18)
LEUKOCYTE ESTERASE UR-ACNC: (no result) LEU/UL
LYMPHOCYTES # BLD AUTO: 2.5 K/UL (ref 1–4.3)
LYMPHOCYTES NFR BLD AUTO: 15.1 % (ref 20–40)
MCH RBC QN AUTO: 27.4 PG (ref 27–31)
MCHC RBC AUTO-ENTMCNC: 33.6 G/DL (ref 33–37)
MCV RBC AUTO: 81.6 FL (ref 80–94)
MONOCYTES # BLD: 1.1 K/UL (ref 0–0.8)
MONOCYTES NFR BLD: 6.6 % (ref 0–10)
NEUTROPHILS # BLD: 13.1 K/UL (ref 1.8–7)
NEUTROPHILS NFR BLD AUTO: 77.7 % (ref 50–75)
NRBC BLD AUTO-RTO: 0.2 % (ref 0–2)
PH UR STRIP: 5 [PH] (ref 5–8)
PLATELET # BLD: 336 K/UL (ref 130–400)
PMV BLD AUTO: 9.2 FL (ref 7.2–11.7)
PROT UR STRIP-MCNC: (no result) MG/DL
RBC # BLD AUTO: 5.92 MIL/UL (ref 4.4–5.9)
RBC # UR STRIP: (no result) /UL
SP GR UR STRIP: 1.01 (ref 1–1.03)
UROBILINOGEN UR-MCNC: NORMAL MG/DL (ref 0.2–1)
WBC # BLD AUTO: 16.8 K/UL (ref 4.8–10.8)

## 2018-06-30 NOTE — CP.PCM.CON
History of Present Illness





- History of Present Illness


History of Present Illness: 





ICU consult for hypoglycemia





Patient is 58-year-old male with history of hypertension, diabetes, presented 

to the ER after being found unresponsive at home with low sugar and possible 

seizure. Patient has a Hx of seizures in the past, last episode was a few weeks 

ago. As per wife, patient was having persistent vomiting for a month, he had a 

GI work up which was negative. 





Past Patient History





- Past Medical History & Family History


Past Medical History?: Yes





- Past Social History


Smoking Status: Light Smoker < 10 Cigarettes Daily





- CARDIAC


Hx Hypertension: Yes





- HEENT


Hx HEENT Problems: Yes


Hx Blind: Yes (LEGALLY)





- ENDOCRINE/METABOLIC


Hx Diabetes Mellitus Type 2: Yes (insulin ndependent)





- MUSCULOSKELETAL/RHEUMATOLOGICAL


Hx Falls: No





- PSYCHIATRIC


Hx Substance Use: Yes (pt denies)





- ANESTHESIA


Hx Anesthesia: Yes


Hx Anesthesia Reactions: No





Meds


Allergies/Adverse Reactions: 


 Allergies











Allergy/AdvReac Type Severity Reaction Status Date / Time


 


No Known Allergies Allergy   Verified 06/30/18 00:01














- Medications


Medications: 


 Current Medications





Dextrose (Dextrose 10% In Water)  1,000 mls @ 100 mls/hr IV .Q10H PRABHA


   Last Admin: 06/30/18 04:42 Dose:  100 mls/hr











Physical Exam





- Head Exam


Head Exam: ATRAUMATIC, NORMOCEPHALIC





- ENT Exam


ENT Exam: Mucous Membranes Moist





- Respiratory Exam


Respiratory Exam: Clear to Auscultation Bilateral





- Cardiovascular Exam


Cardiovascular Exam: REGULAR RHYTHM





Results





- Vital Signs


Recent Vital Signs: 


 Last Vital Signs











Temp      


 


Pulse  92 H  06/30/18 07:46


 


Resp  22   06/30/18 07:46


 


BP  148/98 H  06/30/18 07:46


 


Pulse Ox  100   06/30/18 07:46














- Labs


Result Diagrams: 


 06/30/18 00:17





 06/30/18 06:12


Labs: 


 Laboratory Results - last 24 hr











  06/29/18 06/30/18 06/30/18





  23:53 00:17 00:39


 


WBC   16.8 H 


 


RBC   5.92 H 


 


Hgb   16.2  D 


 


Hct   48.3 


 


MCV   81.6  D 


 


MCH   27.4 


 


MCHC   33.6 


 


RDW   12.8 


 


Plt Count   336 


 


MPV   9.2 


 


Neut % (Auto)   77.7 H 


 


Lymph % (Auto)   15.1 L 


 


Mono % (Auto)   6.6 


 


Eos % (Auto)   0.1 


 


Baso % (Auto)   0.5 


 


Neut # (Auto)   13.1 H 


 


Lymph # (Auto)   2.5 


 


Mono # (Auto)   1.1 H 


 


Eos # (Auto)   0.0 


 


Baso # (Auto)   0.1 


 


Sodium    135


 


Potassium    2.4 L* D


 


Chloride    93 L


 


Carbon Dioxide    18 L


 


Anion Gap    27 H


 


BUN    47 H


 


Creatinine    2.4 H


 


Est GFR ( Amer)    34


 


Est GFR (Non-Af Amer)    28


 


POC Glucose (mg/dL)  228 H  


 


Random Glucose    145 H


 


Calcium    8.3 L


 


Phosphorus   


 


Magnesium   


 


Total Bilirubin    1.4 H


 


AST    53


 


ALT    46


 


Alkaline Phosphatase    151 H


 


Total Protein    7.3


 


Albumin    4.0


 


Globulin    3.3


 


Albumin/Globulin Ratio    1.2


 


Urine Color   


 


Urine Clarity   


 


Urine pH   


 


Ur Specific Gravity   


 


Urine Protein   


 


Urine Glucose (UA)   


 


Urine Ketones   


 


Urine Blood   


 


Urine Nitrate   


 


Urine Bilirubin   


 


Urine Urobilinogen   


 


Ur Leukocyte Esterase   


 


Urine WBC (Auto)   


 


Urine RBC (Auto)   


 


Urine Opiates Screen   


 


Urine Methadone Screen   


 


Ur Barbiturates Screen   


 


Ur Phencyclidine Scrn   


 


Ur Amphetamines Screen   


 


U Benzodiazepines Scrn   


 


U Oth Cocaine Metabols   


 


U Cannabinoids Screen   














  06/30/18 06/30/18 06/30/18





  01:36 02:00 02:44


 


WBC   


 


RBC   


 


Hgb   


 


Hct   


 


MCV   


 


MCH   


 


MCHC   


 


RDW   


 


Plt Count   


 


MPV   


 


Neut % (Auto)   


 


Lymph % (Auto)   


 


Mono % (Auto)   


 


Eos % (Auto)   


 


Baso % (Auto)   


 


Neut # (Auto)   


 


Lymph # (Auto)   


 


Mono # (Auto)   


 


Eos # (Auto)   


 


Baso # (Auto)   


 


Sodium   


 


Potassium   


 


Chloride   


 


Carbon Dioxide   


 


Anion Gap   


 


BUN   


 


Creatinine   


 


Est GFR ( Amer)   


 


Est GFR (Non-Af Amer)   


 


POC Glucose (mg/dL)  < 20 L*  104  < 20 L*


 


Random Glucose   


 


Calcium   


 


Phosphorus   


 


Magnesium   


 


Total Bilirubin   


 


AST   


 


ALT   


 


Alkaline Phosphatase   


 


Total Protein   


 


Albumin   


 


Globulin   


 


Albumin/Globulin Ratio   


 


Urine Color   


 


Urine Clarity   


 


Urine pH   


 


Ur Specific Gravity   


 


Urine Protein   


 


Urine Glucose (UA)   


 


Urine Ketones   


 


Urine Blood   


 


Urine Nitrate   


 


Urine Bilirubin   


 


Urine Urobilinogen   


 


Ur Leukocyte Esterase   


 


Urine WBC (Auto)   


 


Urine RBC (Auto)   


 


Urine Opiates Screen   


 


Urine Methadone Screen   


 


Ur Barbiturates Screen   


 


Ur Phencyclidine Scrn   


 


Ur Amphetamines Screen   


 


U Benzodiazepines Scrn   


 


U Oth Cocaine Metabols   


 


U Cannabinoids Screen   














  06/30/18 06/30/18 06/30/18





  02:48 03:01 04:15


 


WBC   


 


RBC   


 


Hgb   


 


Hct   


 


MCV   


 


MCH   


 


MCHC   


 


RDW   


 


Plt Count   


 


MPV   


 


Neut % (Auto)   


 


Lymph % (Auto)   


 


Mono % (Auto)   


 


Eos % (Auto)   


 


Baso % (Auto)   


 


Neut # (Auto)   


 


Lymph # (Auto)   


 


Mono # (Auto)   


 


Eos # (Auto)   


 


Baso # (Auto)   


 


Sodium   


 


Potassium   


 


Chloride   


 


Carbon Dioxide   


 


Anion Gap   


 


BUN   


 


Creatinine   


 


Est GFR ( Amer)   


 


Est GFR (Non-Af Amer)   


 


POC Glucose (mg/dL)  243 H  175 H  26 L*


 


Random Glucose   


 


Calcium   


 


Phosphorus   


 


Magnesium   


 


Total Bilirubin   


 


AST   


 


ALT   


 


Alkaline Phosphatase   


 


Total Protein   


 


Albumin   


 


Globulin   


 


Albumin/Globulin Ratio   


 


Urine Color   


 


Urine Clarity   


 


Urine pH   


 


Ur Specific Gravity   


 


Urine Protein   


 


Urine Glucose (UA)   


 


Urine Ketones   


 


Urine Blood   


 


Urine Nitrate   


 


Urine Bilirubin   


 


Urine Urobilinogen   


 


Ur Leukocyte Esterase   


 


Urine WBC (Auto)   


 


Urine RBC (Auto)   


 


Urine Opiates Screen   


 


Urine Methadone Screen   


 


Ur Barbiturates Screen   


 


Ur Phencyclidine Scrn   


 


Ur Amphetamines Screen   


 


U Benzodiazepines Scrn   


 


U Oth Cocaine Metabols   


 


U Cannabinoids Screen   














  06/30/18 06/30/18 06/30/18





  04:23 04:44 05:18


 


WBC   


 


RBC   


 


Hgb   


 


Hct   


 


MCV   


 


MCH   


 


MCHC   


 


RDW   


 


Plt Count   


 


MPV   


 


Neut % (Auto)   


 


Lymph % (Auto)   


 


Mono % (Auto)   


 


Eos % (Auto)   


 


Baso % (Auto)   


 


Neut # (Auto)   


 


Lymph # (Auto)   


 


Mono # (Auto)   


 


Eos # (Auto)   


 


Baso # (Auto)   


 


Sodium   


 


Potassium   


 


Chloride   


 


Carbon Dioxide   


 


Anion Gap   


 


BUN   


 


Creatinine   


 


Est GFR ( Amer)   


 


Est GFR (Non-Af Amer)   


 


POC Glucose (mg/dL)   125 H  116 H


 


Random Glucose   


 


Calcium   


 


Phosphorus   


 


Magnesium   


 


Total Bilirubin   


 


AST   


 


ALT   


 


Alkaline Phosphatase   


 


Total Protein   


 


Albumin   


 


Globulin   


 


Albumin/Globulin Ratio   


 


Urine Color  Yellow  


 


Urine Clarity  Clear  


 


Urine pH  5.0  


 


Ur Specific Gravity  1.013  


 


Urine Protein  2+ H  


 


Urine Glucose (UA)  2+ H  


 


Urine Ketones  Negative  


 


Urine Blood  1+ H  


 


Urine Nitrate  Negative  


 


Urine Bilirubin  Negative  


 


Urine Urobilinogen  Normal  


 


Ur Leukocyte Esterase  Neg  


 


Urine WBC (Auto)  < 1  


 


Urine RBC (Auto)  < 1  


 


Urine Opiates Screen   


 


Urine Methadone Screen   


 


Ur Barbiturates Screen   


 


Ur Phencyclidine Scrn   


 


Ur Amphetamines Screen   


 


U Benzodiazepines Scrn   


 


U Oth Cocaine Metabols   


 


U Cannabinoids Screen   














  06/30/18 06/30/18 06/30/18





  05:52 06:12 06:50


 


WBC   


 


RBC   


 


Hgb   


 


Hct   


 


MCV   


 


MCH   


 


MCHC   


 


RDW   


 


Plt Count   


 


MPV   


 


Neut % (Auto)   


 


Lymph % (Auto)   


 


Mono % (Auto)   


 


Eos % (Auto)   


 


Baso % (Auto)   


 


Neut # (Auto)   


 


Lymph # (Auto)   


 


Mono # (Auto)   


 


Eos # (Auto)   


 


Baso # (Auto)   


 


Sodium   128 L 


 


Potassium   3.0 L 


 


Chloride   91 L 


 


Carbon Dioxide   28 


 


Anion Gap   12 


 


BUN   43 H 


 


Creatinine   2.0 H 


 


Est GFR ( Amer)   42 


 


Est GFR (Non-Af Amer)   34 


 


POC Glucose (mg/dL)  83   143 H


 


Random Glucose   205 H 


 


Calcium   7.6 L 


 


Phosphorus   3.8 


 


Magnesium   1.8 


 


Total Bilirubin   1.8 H 


 


AST   36 


 


ALT   42 


 


Alkaline Phosphatase   108 


 


Total Protein   5.6 L 


 


Albumin   3.0 L D 


 


Globulin   2.6 


 


Albumin/Globulin Ratio   1.2 


 


Urine Color   


 


Urine Clarity   


 


Urine pH   


 


Ur Specific Gravity   


 


Urine Protein   


 


Urine Glucose (UA)   


 


Urine Ketones   


 


Urine Blood   


 


Urine Nitrate   


 


Urine Bilirubin   


 


Urine Urobilinogen   


 


Ur Leukocyte Esterase   


 


Urine WBC (Auto)   


 


Urine RBC (Auto)   


 


Urine Opiates Screen   


 


Urine Methadone Screen   


 


Ur Barbiturates Screen   


 


Ur Phencyclidine Scrn   


 


Ur Amphetamines Screen   


 


U Benzodiazepines Scrn   


 


U Oth Cocaine Metabols   


 


U Cannabinoids Screen   














  06/30/18 06/30/18 06/30/18





  07:19 07:53 07:57


 


WBC   


 


RBC   


 


Hgb   


 


Hct   


 


MCV   


 


MCH   


 


MCHC   


 


RDW   


 


Plt Count   


 


MPV   


 


Neut % (Auto)   


 


Lymph % (Auto)   


 


Mono % (Auto)   


 


Eos % (Auto)   


 


Baso % (Auto)   


 


Neut # (Auto)   


 


Lymph # (Auto)   


 


Mono # (Auto)   


 


Eos # (Auto)   


 


Baso # (Auto)   


 


Sodium   


 


Potassium   


 


Chloride   


 


Carbon Dioxide   


 


Anion Gap   


 


BUN   


 


Creatinine   


 


Est GFR ( Amer)   


 


Est GFR (Non-Af Amer)   


 


POC Glucose (mg/dL)  118 H   71


 


Random Glucose   


 


Calcium   


 


Phosphorus   


 


Magnesium   


 


Total Bilirubin   


 


AST   


 


ALT   


 


Alkaline Phosphatase   


 


Total Protein   


 


Albumin   


 


Globulin   


 


Albumin/Globulin Ratio   


 


Urine Color   


 


Urine Clarity   


 


Urine pH   


 


Ur Specific Gravity   


 


Urine Protein   


 


Urine Glucose (UA)   


 


Urine Ketones   


 


Urine Blood   


 


Urine Nitrate   


 


Urine Bilirubin   


 


Urine Urobilinogen   


 


Ur Leukocyte Esterase   


 


Urine WBC (Auto)   


 


Urine RBC (Auto)   


 


Urine Opiates Screen   Negative 


 


Urine Methadone Screen   Negative 


 


Ur Barbiturates Screen   Negative 


 


Ur Phencyclidine Scrn   Negative 


 


Ur Amphetamines Screen   Negative 


 


U Benzodiazepines Scrn   Negative 


 


U Oth Cocaine Metabols   Negative 


 


U Cannabinoids Screen   Negative 














  06/30/18 06/30/18 06/30/18





  08:27 11:24 13:16


 


WBC   


 


RBC   


 


Hgb   


 


Hct   


 


MCV   


 


MCH   


 


MCHC   


 


RDW   


 


Plt Count   


 


MPV   


 


Neut % (Auto)   


 


Lymph % (Auto)   


 


Mono % (Auto)   


 


Eos % (Auto)   


 


Baso % (Auto)   


 


Neut # (Auto)   


 


Lymph # (Auto)   


 


Mono # (Auto)   


 


Eos # (Auto)   


 


Baso # (Auto)   


 


Sodium   


 


Potassium   


 


Chloride   


 


Carbon Dioxide   


 


Anion Gap   


 


BUN   


 


Creatinine   


 


Est GFR ( Amer)   


 


Est GFR (Non-Af Amer)   


 


POC Glucose (mg/dL)  68  171 H  268 H


 


Random Glucose   


 


Calcium   


 


Phosphorus   


 


Magnesium   


 


Total Bilirubin   


 


AST   


 


ALT   


 


Alkaline Phosphatase   


 


Total Protein   


 


Albumin   


 


Globulin   


 


Albumin/Globulin Ratio   


 


Urine Color   


 


Urine Clarity   


 


Urine pH   


 


Ur Specific Gravity   


 


Urine Protein   


 


Urine Glucose (UA)   


 


Urine Ketones   


 


Urine Blood   


 


Urine Nitrate   


 


Urine Bilirubin   


 


Urine Urobilinogen   


 


Ur Leukocyte Esterase   


 


Urine WBC (Auto)   


 


Urine RBC (Auto)   


 


Urine Opiates Screen   


 


Urine Methadone Screen   


 


Ur Barbiturates Screen   


 


Ur Phencyclidine Scrn   


 


Ur Amphetamines Screen   


 


U Benzodiazepines Scrn   


 


U Oth Cocaine Metabols   


 


U Cannabinoids Screen   














Assessment & Plan


(1) Hypoglycemia associated with diabetes


Status: Acute   


Comment: most likely secondary to oral hypoglycemic and insulin.  Continue D10.

  Accu-Chek every hour.  Follow-up chemistry.  Workup for hypoglycemia if no 

change.  hold Oral hypoglycemic agent

## 2018-06-30 NOTE — C.PDOC
History Of Present Illness





<Varun Espana - Last Filed: 18 06:49>





<Michell Aly - Last Filed: 18 17:33>


58 year old male presents to the ER after having a hypoglycemic episode after 

being found unresponsive at home and possible seizure. Patient has a Hx of 

seizures in the past, last episode was a few weeks ago. As per wife, patient 

was having persistent vomiting for a month, he had a GI work up which was 

negative. Denies fever or chills. (Varun Espana)


History Per: Patient


History/Exam Limitations: no limitations


Recent Seizure Activity Began: Unknown


Number Of Seizures: One


Length Of Seizures (Duration): Unknown


Quality Of Seizure: Generalized


Precipitating Factor(s): Other (Hypoglycemia)


Post-ictal Period: No


Recent travel outside of the [place]: No





<Varun Espana - Last Filed: 18 06:49>





<Michell Aly - Last Filed: 18 17:33>


Chief Complaint (Nursing): Seizure





Past Medical History


Reviewed: Historical Data, Nursing Documentation, Vital Signs





- Medical History


PMH: Diabetes, HTN


Family History: States: Unknown Family Hx





- Social History


Hx Alcohol Use: No


Hx Substance Use: Yes (pt denies)





<Varun Espana - Last Filed: 18 06:49>


Vital Signs: 


 Last Vital Signs











Temp  98.8 F   18 12:00


 


Pulse  97 H  18 15:00


 


Resp  21   18 15:00


 


BP  134/75   18 14:46


 


Pulse Ox  100   18 15:00














- [facility] Procedures








EXCISION OF STOMACH, ENDO, DIAGN (18)











Review Of Systems


Constitutional: Negative for: Fever, Chills


Cardiovascular: Negative for: Chest Pain, Palpitations


Respiratory: Negative for: Cough, Shortness of Breath


Gastrointestinal: Negative for: Nausea, Vomiting


Genitourinary: Negative for: Incontinence


Musculoskeletal: Negative for: Neck Pain, Back Pain


Neurological: Positive for: Seizures





<Varun Espana - Last Filed: 18 06:49>





Physical Exam





- Physical Exam


Appears: Non-toxic, Other (Alert, conscious)


Skin: Normal Color, Warm, Dry


Head: Atraumatic, Normacephalic


Eye(s): bilateral: Other (Legally blind)


Oral Mucosa: Moist


Neck: Normal, Supple


Chest: Symmetrical, No Tenderness


Cardiovascular: Rhythm Regular


Respiratory: Normal Breath Sounds, No Rales, No Rhonchi, No Wheezing


Gastrointestinal/Abdominal: Soft, No Tenderness


Neurological/Psych: Oriented x3, Normal Speech, Other (No focal deficits)





<Varun Espana - Last Filed: 18 06:49>





ED Course And Treatment





- Laboratory Results


Result Diagrams: 


 18 00:17





 18 00:39


ECG: Interpreted By Me, Viewed By Me


ECG Rhythm: Sinus Rhythm, ST/T Changes


ECG Interpretation: No Acute Changes, Abnormal


Interpretation Of ECG: NSR, ST-T abnormality, no significant chnge from old 

tracings of 2018


Rate From EC


O2 Sat by Pulse Oximetry: 100 (Room air)


Pulse Ox Interpretation: Normal


Progress Note: CT head, EKG, blood work, and urinalysis ordered.





<Varun Espana - Last Filed: 18 06:49>





- Laboratory Results


Result Diagrams: 


 18 00:17





 18 06:12





<Michell Aly A - Last Filed: 18 17:33>





Critical Care Time





- Critical Care Note


Total Time (in mins): 45


Documented critical care: time excludes all time spent performing seperately 

billable procedures.





<Michell Aly A - Last Filed: 18 17:33>





Disposition


Discussed With : Adithya Martinez


Doctor Will See Patient In The: Hospital


Counseled Patient/Family Regarding: Diagnosis





- Disposition


Disposition Time: 05:30





<Varun Espana - Last Filed: 18 06:49>





- Disposition


Disposition Time: 08:08





<Michell Aly - Last Filed: 18 17:33>





- Disposition


Disposition: HOSPITALIZED


Condition: GUARDED





- Clinical Impression


Clinical Impression: 


 Hypoglycemia associated with diabetes, Hypokalemia, Seizure








- Scribe Statement


The provider has reviewed the documentation as recorded by the Scribe





<Varun Espana - Last Filed: 18 06:49>





<Michell Aly A - Last Filed: 18 17:33>





- Scribe Statement





Sanya Hussein





All medical record entries made by the Scribe were at my direction and 

personally dictated by me. I have reviewed the chart and agree that the record 

accurately reflects my personal performance of the history, physical exam, 

medical decision making, and the department course for this patient. I have 

also personally directed, reviewed, and agree with the discharge instructions 

and disposition. (Varun Espana)





Addendum





<Varun Espana - Last Filed: 18 06:49>





<Michell Aly - Last Filed: 18 17:33>


Addendum: 





18 07:16


Patient resting comfortably, awake & alert.  Pending repeat chemistry as per 

night intensivist Dr. Rahman.  Day intensivist Dr. Randhawa aware.





18 08:08


Repeat accucheck 71 at 7:57, 118 at 7:19, 143 at 6:50.  Patient continues to 

drop blood sugar despite multiple boluses of D50, IV D10 drip, IV glucagon and 

IV solumedrol.  Patient not appropriate for telemetry.  Spoke with Dr. Randhawa 

intensivist, agrees with ICU upgrade.





 (Michell Aly)





Decision To Admit





<Varun Espana - Last Filed: 18 06:49>





- Pt Status Changed To:


Hospital Disposition Of: Inpatient





- Admit Certification


Admit to Inpatient:: After my assessment, the patient will require 

hospitalization for at least two midnights.  This is because of the severity of 

symptoms shown, intensity of services needed, and/or the medical risk in this 

patient being treated as an outpatient.





- InPatient:


Physician Admission Certification: I certify that this patient requires 2 or 

more midnights of care for the following reason:: see notes





- .


Bed Request Type: ICU


Admitting Physician: Adithya Martinez





<Michell Aly - Last Filed: 18 17:33>





- .


Patient Diagnosis: 


 Hypoglycemia associated with diabetes, Hypokalemia

## 2018-06-30 NOTE — CT
PROCEDURE:  CT HEAD WITHOUT CONTRAST.



HISTORY:

Headache



COMPARISON:

None comparison made with prior CT scan brain 06/14/2018. 



TECHNIQUE:

Axial computed tomography images were obtained through the head/brain 

without intravenous contrast.  



Radiation dose:



Total exam DLP =  947.89 mGy-cm.



This CT exam was performed using one or more of the following dose 

reduction techniques: Automated exposure control, adjustment of the 

mA and/or kV according to patient size, and/or use of iterative 

reconstruction technique.



FINDINGS:



HEMORRHAGE:

No intracranial hemorrhage. 



BRAIN:

Mild chronic periventricular white matter ischemic changes. .



Mild moderate central volume loss.



VENTRICLES:

No obstructive hydrocephalus however there is persistent slight 

asymmetry of the lateral ventricles on right side slightly larger 

than the left likely a anatomic variation. 



CALVARIUM:

Unremarkable.



PARANASAL SINUSES:

Unremarkable as visualized. No significant inflammatory changes.



MASTOID AIR CELLS:

Unremarkable as visualized. No inflammatory changes.



OTHER FINDINGS:

None.



IMPRESSION:

No acute intracranial hemorrhage.



Mild chronic white matter ischemic changes.



Mild moderate central volume loss.

## 2018-07-01 LAB
ALBUMIN SERPL-MCNC: 3.6 G/DL (ref 3.5–5)
ALBUMIN/GLOB SERPL: 1.2 {RATIO} (ref 1–2.1)
ALT SERPL-CCNC: 43 U/L (ref 21–72)
AST SERPL-CCNC: 35 U/L (ref 17–59)
BASOPHILS # BLD AUTO: 0.1 K/UL (ref 0–0.2)
BASOPHILS NFR BLD: 0.3 % (ref 0–2)
BUN SERPL-MCNC: 36 MG/DL (ref 9–20)
CALCIUM SERPL-MCNC: 8.3 MG/DL (ref 8.6–10.4)
CK MB SERPL-MCNC: 2.07 NG/ML (ref 0–3.38)
EOSINOPHIL # BLD AUTO: 0 K/UL (ref 0–0.7)
EOSINOPHIL NFR BLD: 0.1 % (ref 0–4)
ERYTHROCYTE [DISTWIDTH] IN BLOOD BY AUTOMATED COUNT: 13.1 % (ref 11.5–14.5)
GFR NON-AFRICAN AMERICAN: 36
HGB BLD-MCNC: 15 G/DL (ref 12–18)
LYMPHOCYTES # BLD AUTO: 3.2 K/UL (ref 1–4.3)
LYMPHOCYTES NFR BLD AUTO: 18.4 % (ref 20–40)
MCH RBC QN AUTO: 27.6 PG (ref 27–31)
MCHC RBC AUTO-ENTMCNC: 33.4 G/DL (ref 33–37)
MCV RBC AUTO: 82.6 FL (ref 80–94)
MONOCYTES # BLD: 1.2 K/UL (ref 0–0.8)
MONOCYTES NFR BLD: 6.7 % (ref 0–10)
NEUTROPHILS # BLD: 13 K/UL (ref 1.8–7)
NEUTROPHILS NFR BLD AUTO: 74.5 % (ref 50–75)
NRBC BLD AUTO-RTO: 0 % (ref 0–2)
PLATELET # BLD: 312 K/UL (ref 130–400)
PMV BLD AUTO: 9.7 FL (ref 7.2–11.7)
RBC # BLD AUTO: 5.44 MIL/UL (ref 4.4–5.9)
TROPONIN I SERPL-MCNC: 0.03 NG/ML (ref 0–0.12)
WBC # BLD AUTO: 17.5 K/UL (ref 4.8–10.8)

## 2018-07-01 NOTE — RAD
PROCEDURE:  CHEST RADIOGRAPH, 1 VIEW



HISTORY:

ELEVATED WBC



COMPARISON:

Comparison chest dated 06/06/2018



FINDINGS:



LUNGS:

Clear.



PLEURA:

No pneumothorax or pleural fluid seen.



CARDIOVASCULAR:

Normal.



OSSEOUS STRUCTURES:

No significant abnormalities.



VISUALIZED UPPER ABDOMEN:

Normal.



OTHER FINDINGS:

None. 



IMPRESSION:

No active disease.

## 2018-07-01 NOTE — CP.PCM.CON
History of Present Illness





- History of Present Illness


History of Present Illness: 


CC: Chest Pain





Patient is 58-year-old male with history of hypertension, diabetes, presented 

to the ER after being found unresponsive at home with low sugar and possible 

seizure. Patient has a Hx of seizures in the past, last episode was a few weeks 

ago. As per wife, patient was having persistent vomiting for a month, he had a 

GI work up which was negative. 





Patient c/o right sided chest pain and non exertional





 Physical Exam 





- Head Exam


Head Exam: ATRAUMATIC, NORMOCEPHALIC





- ENT Exam


ENT Exam: Mucous Membranes Moist





- Respiratory Exam


Respiratory Exam: Clear to Auscultation Bilateral





- Cardiovascular Exam


Cardiovascular Exam: REGULAR RHYTHM








Past Patient History





- Past Medical History & Family History


Past Medical History?: Yes





- Past Social History


Smoking Status: Light Smoker < 10 Cigarettes Daily





- CARDIAC


Hx Hypertension: Yes





- NEUROLOGICAL


Hx Seizures: Yes (Prior to arrival in ED)





- HEENT


Hx HEENT Problems: Yes


Hx Blind: Yes (LEGALLY)





- ENDOCRINE/METABOLIC


Hx Diabetes Mellitus Type 2: Yes (insulin ndependent)





- MUSCULOSKELETAL/RHEUMATOLOGICAL


Hx Falls: No





- PSYCHIATRIC


Hx Substance Use: Yes (pt denies)





- ANESTHESIA


Hx Anesthesia: Yes


Hx Anesthesia Reactions: No





Meds


Allergies/Adverse Reactions: 


 Allergies











Allergy/AdvReac Type Severity Reaction Status Date / Time


 


No Known Allergies Allergy   Verified 06/30/18 00:01














- Medications


Medications: 


 Current Medications





Sodium Chloride (Sodium Chloride 0.9%)  1,000 mls @ 100 mls/hr IV .Q10H [facility]


   Last Admin: 07/01/18 18:28 Dose:  100 mls/hr


Pantoprazole Sodium (Protonix Inj)  40 mg IVP DAILY [facility]


   Last Admin: 07/01/18 10:04 Dose:  40 mg











Results





- Vital Signs


Recent Vital Signs: 


 Last Vital Signs











Temp  98.8 F   07/01/18 20:00


 


Pulse  88   07/01/18 16:06


 


Resp  19   07/01/18 16:06


 


BP  154/100 H  07/01/18 16:06


 


Pulse Ox  98   07/01/18 16:06














- Labs


Result Diagrams: 


 07/01/18 08:06





 07/01/18 08:06


Labs: 


 Laboratory Results - last 24 hr











  06/30/18 07/01/18 07/01/18





  23:44 00:55 05:11


 


WBC   


 


RBC   


 


Hgb   


 


Hct   


 


MCV   


 


MCH   


 


MCHC   


 


RDW   


 


Plt Count   


 


MPV   


 


Neut % (Auto)   


 


Lymph % (Auto)   


 


Mono % (Auto)   


 


Eos % (Auto)   


 


Baso % (Auto)   


 


Neut # (Auto)   


 


Lymph # (Auto)   


 


Mono # (Auto)   


 


Eos # (Auto)   


 


Baso # (Auto)   


 


Sodium   


 


Potassium   


 


Chloride   


 


Carbon Dioxide   


 


Anion Gap   


 


BUN   


 


Creatinine   


 


Est GFR ( Amer)   


 


Est GFR (Non-Af Amer)   


 


POC Glucose (mg/dL)  278 H   225 H


 


Random Glucose   


 


Calcium   


 


Total Bilirubin   


 


AST   


 


ALT   


 


Alkaline Phosphatase   


 


Total Creatine Kinase   291 H 


 


CK-MB (Mass)   2.07 


 


Troponin I   0.0290 


 


Total Protein   


 


Albumin   


 


Globulin   


 


Albumin/Globulin Ratio   














  07/01/18 07/01/18 07/01/18





  07:25 08:06 08:06


 


WBC    17.5 H


 


RBC    5.44


 


Hgb    15.0


 


Hct    44.9


 


MCV    82.6


 


MCH    27.6


 


MCHC    33.4


 


RDW    13.1


 


Plt Count    312


 


MPV    9.7


 


Neut % (Auto)    74.5


 


Lymph % (Auto)    18.4 L


 


Mono % (Auto)    6.7


 


Eos % (Auto)    0.1


 


Baso % (Auto)    0.3


 


Neut # (Auto)    13.0 H


 


Lymph # (Auto)    3.2


 


Mono # (Auto)    1.2 H


 


Eos # (Auto)    0.0


 


Baso # (Auto)    0.1


 


Sodium   132 


 


Potassium   4.3 


 


Chloride   98 


 


Carbon Dioxide   24 


 


Anion Gap   15 


 


BUN   36 H 


 


Creatinine   1.9 H 


 


Est GFR ( Amer)   44 


 


Est GFR (Non-Af Amer)   36 


 


POC Glucose (mg/dL)  237 H  


 


Random Glucose   212 H 


 


Calcium   8.3 L 


 


Total Bilirubin   1.7 H 


 


AST   35 


 


ALT   43 


 


Alkaline Phosphatase   141 H D 


 


Total Creatine Kinase   


 


CK-MB (Mass)   


 


Troponin I   


 


Total Protein   6.7 


 


Albumin   3.6 


 


Globulin   3.1 


 


Albumin/Globulin Ratio   1.2 














  07/01/18 07/01/18 07/01/18





  11:26 15:55 19:56


 


WBC   


 


RBC   


 


Hgb   


 


Hct   


 


MCV   


 


MCH   


 


MCHC   


 


RDW   


 


Plt Count   


 


MPV   


 


Neut % (Auto)   


 


Lymph % (Auto)   


 


Mono % (Auto)   


 


Eos % (Auto)   


 


Baso % (Auto)   


 


Neut # (Auto)   


 


Lymph # (Auto)   


 


Mono # (Auto)   


 


Eos # (Auto)   


 


Baso # (Auto)   


 


Sodium   


 


Potassium   


 


Chloride   


 


Carbon Dioxide   


 


Anion Gap   


 


BUN   


 


Creatinine   


 


Est GFR ( Amer)   


 


Est GFR (Non-Af Amer)   


 


POC Glucose (mg/dL)  154 H  97  162 H


 


Random Glucose   


 


Calcium   


 


Total Bilirubin   


 


AST   


 


ALT   


 


Alkaline Phosphatase   


 


Total Creatine Kinase   


 


CK-MB (Mass)   


 


Troponin I   


 


Total Protein   


 


Albumin   


 


Globulin   


 


Albumin/Globulin Ratio   














Assessment & Plan


(1) Chest pain


Assessment and Plan: 


Given description unlikely cardiac





Trops negative


Check ECHO


If wall motion abnormalities or recurrent kiera pain will get stress test 


Status: Acute

## 2018-07-01 NOTE — HP
HISTORY OF PRESENT ILLNESS:  A 58-year-old male with diabetes.  The patient

had drug abuse in the past.  Admitted to the hospital with a chief

complaint of severe hypoglycemia, loss of consciousness, fever.  The

patient has been vomiting, _____ insulin.  The patient came to the ER,

found to have severe hypoglycemia per history, was sent to ICU.



PHYSICAL EXAMINATION:

GENERAL:  The patient is awake, alert, and oriented.

VITAL SIGNS:  Temperature 98, pulse 90.

HEENT:  Within normal limits.

NECK:  Supple.

CHEST:  Symmetrical.

HEART:  Regular.

ABDOMEN:  Soft.

EXTREMITIES:  No edema.



IMPRESSION:  Severe hypoglycemia, gastritis, blindness.  The patient with

IV glucose.  Monitor blood sugar.





__________________________________________

Aidthya Martinez MD





DD:  06/30/2018 10:24:04

DT:  06/30/2018 11:45:30

Job # [id number]

## 2018-07-02 LAB
ALBUMIN SERPL-MCNC: 3 G/DL (ref 3.5–5)
ALBUMIN/GLOB SERPL: 1.1 {RATIO} (ref 1–2.1)
ALT SERPL-CCNC: 42 U/L (ref 21–72)
AST SERPL-CCNC: 40 U/L (ref 17–59)
BASOPHILS # BLD AUTO: 0.1 K/UL (ref 0–0.2)
BASOPHILS NFR BLD: 0.5 % (ref 0–2)
BUN SERPL-MCNC: 26 MG/DL (ref 9–20)
CALCIUM SERPL-MCNC: 8 MG/DL (ref 8.6–10.4)
EOSINOPHIL # BLD AUTO: 0 K/UL (ref 0–0.7)
EOSINOPHIL NFR BLD: 0.2 % (ref 0–4)
ERYTHROCYTE [DISTWIDTH] IN BLOOD BY AUTOMATED COUNT: 13 % (ref 11.5–14.5)
GFR NON-AFRICAN AMERICAN: 39
HGB BLD-MCNC: 13.8 G/DL (ref 12–18)
LYMPHOCYTES # BLD AUTO: 3.9 K/UL (ref 1–4.3)
LYMPHOCYTES NFR BLD AUTO: 34.8 % (ref 20–40)
MCH RBC QN AUTO: 28.8 PG (ref 27–31)
MCHC RBC AUTO-ENTMCNC: 34.3 G/DL (ref 33–37)
MCV RBC AUTO: 83.9 FL (ref 80–94)
MONOCYTES # BLD: 0.8 K/UL (ref 0–0.8)
MONOCYTES NFR BLD: 7.6 % (ref 0–10)
NEUTROPHILS # BLD: 6.3 K/UL (ref 1.8–7)
NEUTROPHILS NFR BLD AUTO: 56.9 % (ref 50–75)
NRBC BLD AUTO-RTO: 0 % (ref 0–2)
PLATELET # BLD: 156 K/UL (ref 130–400)
PMV BLD AUTO: 9.9 FL (ref 7.2–11.7)
RBC # BLD AUTO: 4.8 MIL/UL (ref 4.4–5.9)
WBC # BLD AUTO: 11.1 K/UL (ref 4.8–10.8)

## 2018-07-02 RX ADMIN — HUMAN INSULIN SCH: 100 INJECTION, SOLUTION SUBCUTANEOUS at 22:22

## 2018-07-02 RX ADMIN — HUMAN INSULIN SCH: 100 INJECTION, SOLUTION SUBCUTANEOUS at 17:08

## 2018-07-02 NOTE — CP.PCM.PN
Subjective





- Date & Time of Evaluation


Date of Evaluation: 07/02/18


Time of Evaluation: 11:37





- Subjective


Subjective: 





Internal Medicine Progress Note- Dr Martinez Service





Patient seen and examined at bedside. Per nursing no acute events overnight. 

Patient is complaining about heart burn, wants to go home. Offers no complaints 

at this time. Denies headaches, dizziness, cp, palpitations, sob, abdominal pain

, nausea/vomiting. 





Objective





- Vital Signs/Intake and Output


Vital Signs (last 24 hours): 


 











Temp Pulse Resp BP Pulse Ox


 


 98.6 F   84   17   136/83   99 


 


 07/02/18 04:00  07/02/18 02:03  07/02/18 02:03  07/02/18 02:03  07/01/18 23:00








Intake and Output: 


 











 07/02/18 07/02/18





 06:59 18:59


 


Intake Total 1740 


 


Output Total 1200 200


 


Balance 540 -200














- Medications


Medications: 


 Current Medications





Al Hydrox/Mg Hydrox/Simethicone (Maalox Plus 30 Ml)  30 ml PO DAILY [facility]


Insulin Glargine (Lantus)  5 unit SC HS [facility]


Insulin Human Regular (Novolin R)  0 unit SC ACHS [facility]


   PRN Reason: Protocol


Pantoprazole Sodium (Protonix Inj)  40 mg IVP DAILY [facility]


   Last Admin: 07/02/18 09:07 Dose:  40 mg











- Labs


Labs: 


 





 07/02/18 06:13 





 07/02/18 06:13 











- Constitutional


Appears: Well, No Acute Distress





- Head Exam


Head Exam: ATRAUMATIC, NORMAL INSPECTION, NORMOCEPHALIC





- Eye Exam


Eye Exam: Normal appearance, Nystagmus


Additional comments: 





+legally blind





- ENT Exam


ENT Exam: Mucous Membranes Moist





- Neck Exam


Neck Exam: Full ROM





- Respiratory Exam


Respiratory Exam: Clear to Ausculation Bilateral, NORMAL BREATHING PATTERN.  

absent: Rales, Rhonchi, Wheezes





- Cardiovascular Exam


Cardiovascular Exam: REGULAR RHYTHM, +S1, +S2





- GI/Abdominal Exam


GI & Abdominal Exam: Soft, Normal Bowel Sounds.  absent: Guarding, Rigid, 

Tenderness





- Extremities Exam


Extremities Exam: Normal Inspection





- Neurological Exam


Neurological Exam: Alert, Awake, Oriented x3





- Psychiatric Exam


Psychiatric exam: Normal Affect, Normal Mood





- Skin


Skin Exam: Dry, Normal Color, Warm





Assessment and Plan





- Assessment and Plan (Free Text)


Assessment: 





A/P: Patient is a 58 year old male with past medical history of Hypertension, 

Diabetes Mellitus, bilateral vision loss 2/2 glaucoma presented to the ED with 

severe hypoglycemia, loss of conciousness and fever. As per wife, patient was 

having persistent vomiting for a month, he had a GI work up which was negative.





Severe Hypoglycemia


-Stable, afebrile


-CT head showed no acute intracranial hemorrhage


-Blood sugars have improved


-Will start Lantus 5 units HS


-Moderate insulin sliding scale


-Hypoglycemia protocol


-Accuchecks ACHS


-Last A1C 7.2 in 6/2018


-PT eval ordered





Chest pain likey 2/2 GERD


-Troponins negative


-Echo completed, awaiting official report


-Cardiology on consult, help appreciated


-Per cardio, If wall motion abnormalities or recurrent chest pain will get 

stress test 


-Mylanta daily 30ml PO daily


-Continue protonix 40mg IVP daily





Chronic Kidney Disease


-BUN/Cr 26/1.8


-Kidney function improving 


-Continue to monitor at this time





Hypokalemia


-Continue to monitor and replete as needed





History of Hypertension


-Currently normatensive 


-Continue home dose of Norvasc and Metoprolol 





GI/DVT ppx:


Protonix 40mg IVP daily


Heparin 5000 units Q12H SC





Plan discussed with Dr Juan Bobby DO PGY-2

## 2018-07-02 NOTE — CARD
--------------- APPROVED REPORT --------------





EXAM: Two-dimensional and M-mode echocardiogram with Doppler and 

color Doppler.



Other Information 

Quality : GoodRhythm : 



INDICATION

Abnormal EKG/Arrhythmia Chest Pain 



RISK FACTORS

Hypertension 



2D DIMENSIONS 

IVSd1.1   (0.7-1.1cm)LVDd3.2   (3.9-5.9cm)

PWd1.3   (0.7-1.1cm)LVDs2.0   (2.5-4.0cm)

FS (%) 35.5   %LVEF (%)66.4   (>50%)



M-Mode DIMENSIONS 

Left Atrium (MM)3.09   (2.5-4.0cm)IVSd0.94   (0.7-1.1cm)

Aortic Root3.07   (2.2-3.7cm)LVDd5.22   (4.0-5.6cm)

Aortic Cusp Exc.2.07   (1.5-2.0cm)PWd0.80   (0.7-1.1cm)

FS (%) 40   %LVDs3.15   (2.0-3.8cm)

LVEF (%)70   (>50%)



Mitral Valve

MV E Xnshjnti38.8cm/sMV A Izobjffe06.6cm/sE/A ratio0.5



TDI

E/Lateral E'0.0E/Medial E'0.0



Tricuspid Valve

TR Peak [id number]/sTR Peak Gr.[id number]



 LEFT VENTRICLE 

The left ventricle is normal size.

There is normal left ventricular wall thickness.

Left ventricle systolic function is normal. The Ejection Fraction is  

65-70%.

There is normal LV segmental wall motion.

The left ventricular diastolic function is abnormal-Grade I-abnormal 

relaxation pattern.

No left ventricle thrombus noted on this study.



 RIGHT VENTRICLE 

The right ventricle is normal size.

The right ventricular systolic function is normal.



 ATRIA 

The left atrium size is normal.

The right atrium size is normal.



 AORTIC VALVE 

The aortic valve is mildly to moderately sclerotic.

The aortic valve is trileaflet.

No aortic regurgitation is present.

There is no aortic valvular stenosis. 

There is no aortic valvular vegetation.



 MITRAL VALVE 

Mitral annular calcification is mild to moderate.

There is no evidence of mitral valve prolapse.

There is no mitral valve stenosis.

There is no mitral valve regurgitation noted.



 TRICUSPID VALVE 

The tricuspid valve is normal in structure.

There is no tricuspid valve regurgitation noted.

There is no tricuspid valve prolapse or vegetation.

There is no tricuspid valve stenosis. 



 PULMONIC VALVE 

The pulmonic valve is not well visualized.

There is no pulmonic valvular regurgitation. 



 GREAT VESSELS 

The aortic root is normal in size.

The IVC is normal in size and collapses >50% with inspiration.



 PERICARDIAL EFFUSION 

There is no pericardial effusion.

There is no pleural effusion.



<Conclusion>

The left ventricle is normal size.

Left ventricle systolic function is normal. The Ejection Fraction is  

65-70%.

The left ventricular diastolic function is abnormal-Grade I-abnormal 

relaxation pattern.

The right ventricle is normal size.

The right ventricular systolic function is normal.

The left atrium size is normal.

The right atrium size is normal.

Normal valves.

## 2018-07-02 NOTE — CARD
--------------- APPROVED REPORT --------------





EKG Measurement

Heart [id number]

NE 128P-6

[id number]

[id number]

[id number]



<Conclusion>

Normal sinus rhythm

ST & T wave abnormality, consider inferior ischemia

ST & T wave abnormality, consider anterolateral ischemia

Prolonged QT

Abnormal ECG

## 2018-07-02 NOTE — CP.PCM.PN
Subjective





- Date & Time of Evaluation


Date of Evaluation: 07/02/18


Time of Evaluation: 15:05





- Subjective


Subjective: 





Patient seen and evaluated


Comfortable





 Physical Exam 





- Head Exam


Head Exam: ATRAUMATIC, NORMOCEPHALIC





- ENT Exam


ENT Exam: Mucous Membranes Moist





- Respiratory Exam


Respiratory Exam: Clear to Auscultation Bilateral





- Cardiovascular Exam


Cardiovascular Exam: REGULAR RHYTHM











 Chest Pain 





Given description unlikely cardiac





Trops negative


ECHO no WMA





No further cardiac work up needed at this time


Medical management





Objective





- Vital Signs/Intake and Output


Vital Signs (last 24 hours): 


 











Temp Pulse Resp BP Pulse Ox


 


 98.4 F   80   20   150/80   100 


 


 07/02/18 16:54  07/02/18 16:54  07/02/18 16:54  07/02/18 17:10  07/02/18 16:54








Intake and Output: 


 











 07/02/18 07/03/18





 18:59 06:59


 


Intake Total 480 


 


Output Total 725 


 


Balance -245 














- Medications


Medications: 


 Current Medications





Al Hydrox/Mg Hydrox/Simethicone (Maalox Plus 30 Ml)  30 ml PO DAILY [facility]


   Last Admin: 07/02/18 12:08 Dose:  30 ml


Amlodipine Besylate (Norvasc)  10 mg PO DAILY [facility]


Dextrose (Dextrose 50% Inj)  0 ml IV STAT PRN; Protocol


   PRN Reason: Hypoglycemia Protocol


Dextrose (Glutose 15)  15 gm PO ONCE PRN; Protocol


   PRN Reason: Hypoglycemia Protocol


Glucagon (Glucagen Diagnostic Kit)  1 mg IM STAT PRN; Protocol


   PRN Reason: Hypoglycemia Protocol


Heparin Sodium (Porcine) (Heparin)  5,000 units SC Q12 [facility]


   Last Admin: 07/02/18 22:23 Dose:  5,000 units


Dextrose (Dextrose 5% In Water 1000 Ml)  1,000 mls @ 0 mls/hr IV .Q0M PRN; 

Protocol; Per Protocol


   PRN Reason: Hypoglycemia Protocol


Insulin Glargine (Lantus)  5 unit SC HS [facility]


   Last Admin: 07/02/18 22:23 Dose:  5 u


Insulin Human Regular (Novolin R)  0 unit SC ACHS [facility]


   PRN Reason: Protocol


   Last Admin: 07/02/18 22:22 Dose:  Not Given


Metoprolol Tartrate (Lopressor)  25 mg PO BID [facility]


   Last Admin: 07/02/18 17:10 Dose:  25 mg


Pantoprazole Sodium (Protonix Inj)  40 mg IVP DAILY [facility]


   Last Admin: 07/02/18 09:07 Dose:  40 mg











- Labs


Labs: 


 





 07/02/18 06:13 





 07/02/18 06:13 











Assessment and Plan


(1) Chest pain


Status: Acute

## 2018-07-02 NOTE — CARD
--------------- APPROVED REPORT --------------





EKG Measurement

Heart [id number]

WI [id number]

[id number]

[id number]

[id number]



<Conclusion>

Normal sinus rhythm

Septal infarct, age undetermined

T wave abnormality, consider inferolateral ischemia

Abnormal ECG

## 2018-07-03 VITALS — OXYGEN SATURATION: 100 %

## 2018-07-03 VITALS
RESPIRATION RATE: 20 BRPM | SYSTOLIC BLOOD PRESSURE: 143 MMHG | DIASTOLIC BLOOD PRESSURE: 83 MMHG | HEART RATE: 77 BPM | TEMPERATURE: 98.3 F

## 2018-07-03 LAB
ALBUMIN SERPL-MCNC: 3.1 G/DL (ref 3.5–5)
ALBUMIN/GLOB SERPL: 1.1 {RATIO} (ref 1–2.1)
ALT SERPL-CCNC: 45 U/L (ref 21–72)
AST SERPL-CCNC: 32 U/L (ref 17–59)
BASOPHILS # BLD AUTO: 0 K/UL (ref 0–0.2)
BASOPHILS NFR BLD: 0.3 % (ref 0–2)
BUN SERPL-MCNC: 22 MG/DL (ref 9–20)
CALCIUM SERPL-MCNC: 8.3 MG/DL (ref 8.6–10.4)
EOSINOPHIL # BLD AUTO: 0 K/UL (ref 0–0.7)
EOSINOPHIL NFR BLD: 0.3 % (ref 0–4)
ERYTHROCYTE [DISTWIDTH] IN BLOOD BY AUTOMATED COUNT: 13.2 % (ref 11.5–14.5)
GFR NON-AFRICAN AMERICAN: 48
HGB BLD-MCNC: 14.8 G/DL (ref 12–18)
LYMPHOCYTES # BLD AUTO: 4.1 K/UL (ref 1–4.3)
LYMPHOCYTES NFR BLD AUTO: 34 % (ref 20–40)
MCH RBC QN AUTO: 28.4 PG (ref 27–31)
MCHC RBC AUTO-ENTMCNC: 34 G/DL (ref 33–37)
MCV RBC AUTO: 83.5 FL (ref 80–94)
MONOCYTES # BLD: 1 K/UL (ref 0–0.8)
MONOCYTES NFR BLD: 8.1 % (ref 0–10)
NEUTROPHILS # BLD: 7 K/UL (ref 1.8–7)
NEUTROPHILS NFR BLD AUTO: 57.3 % (ref 50–75)
NRBC BLD AUTO-RTO: 0.1 % (ref 0–2)
PLATELET # BLD: 250 K/UL (ref 130–400)
PMV BLD AUTO: 9.3 FL (ref 7.2–11.7)
RBC # BLD AUTO: 5.2 MIL/UL (ref 4.4–5.9)
WBC # BLD AUTO: 12.2 K/UL (ref 4.8–10.8)

## 2018-07-03 RX ADMIN — HUMAN INSULIN SCH U: 100 INJECTION, SOLUTION SUBCUTANEOUS at 07:37

## 2018-07-03 RX ADMIN — HUMAN INSULIN SCH U: 100 INJECTION, SOLUTION SUBCUTANEOUS at 12:30

## 2018-07-03 NOTE — CP.PCM.PN
Subjective





- Date & Time of Evaluation


Date of Evaluation: 07/03/18


Time of Evaluation: 09:15





- Subjective


Subjective: 





Patient seen and evaluated


Comfortable





 Physical Exam 





- Head Exam


Head Exam: ATRAUMATIC, NORMOCEPHALIC





- ENT Exam


ENT Exam: Mucous Membranes Moist





- Respiratory Exam


Respiratory Exam: Clear to Auscultation Bilateral





- Cardiovascular Exam


Cardiovascular Exam: REGULAR RHYTHM











 Chest Pain 





Given description unlikely cardiac





Trops negative


ECHO no WMA





No further cardiac work up needed at this time


Medical management





Objective





- Vital Signs/Intake and Output


Vital Signs (last 24 hours): 


 











Temp Pulse Resp BP Pulse Ox


 


 98.3 F   77   20   143/83   100 


 


 07/03/18 12:00  07/03/18 12:00  07/03/18 12:00  07/03/18 12:00  07/03/18 12:00








Intake and Output: 


 











 07/03/18 07/04/18





 18:59 06:59


 


Intake Total 240 


 


Output Total 400 


 


Balance -160 














- Labs


Labs: 


 





 07/03/18 06:14 





 07/03/18 06:14 











Assessment and Plan


(1) Chest pain


Status: Acute

## 2018-07-03 NOTE — CP.PCM.PN
Subjective





- Date & Time of Evaluation


Date of Evaluation: 07/03/18


Time of Evaluation: 07:57





- Subjective


Subjective: 





Internal Medicine Progress Note - Dr Martinez Service





Patient seen and examined at bedside. Per nursing no acute events overnight. 

Patient is doing well, states that heart burn is improving. Offers no 

complaints at this time. Patient desires to go home. Denies headaches, dizziness

, cp, palpitations, sob, abdominal pain, N/V, urinary symptoms. 





Objective





- Vital Signs/Intake and Output


Vital Signs (last 24 hours): 


 











Temp Pulse Resp BP Pulse Ox


 


 98.2 F   93 H  22   154/100 H  100 


 


 07/03/18 07:54  07/03/18 07:54  07/03/18 07:54  07/03/18 07:54  07/03/18 07:54








Intake and Output: 


 











 07/03/18 07/03/18





 06:59 18:59


 


Intake Total 450 


 


Output Total 800 


 


Balance -350 














- Medications


Medications: 


 Current Medications





Al Hydrox/Mg Hydrox/Simethicone (Maalox Plus 30 Ml)  30 ml PO DAILY [facility]


   Last Admin: 07/02/18 12:08 Dose:  30 ml


Amlodipine Besylate (Norvasc)  10 mg PO DAILY [facility]


Dextrose (Dextrose 50% Inj)  0 ml IV STAT PRN; Protocol


   PRN Reason: Hypoglycemia Protocol


Dextrose (Glutose 15)  15 gm PO ONCE PRN; Protocol


   PRN Reason: Hypoglycemia Protocol


Glucagon (Glucagen Diagnostic Kit)  1 mg IM STAT PRN; Protocol


   PRN Reason: Hypoglycemia Protocol


Heparin Sodium (Porcine) (Heparin)  5,000 units SC Q12 [facility]


   Last Admin: 07/02/18 22:23 Dose:  5,000 units


Dextrose (Dextrose 5% In Water 1000 Ml)  1,000 mls @ 0 mls/hr IV .Q0M PRN; 

Protocol; Per Protocol


   PRN Reason: Hypoglycemia Protocol


Insulin Glargine (Lantus)  5 unit SC HS [facility]


   Last Admin: 07/02/18 22:23 Dose:  5 u


Insulin Human Regular (Novolin R)  0 unit SC ACHS [facility]


   PRN Reason: Protocol


   Last Admin: 07/03/18 07:37 Dose:  2 u


Metoprolol Tartrate (Lopressor)  25 mg PO BID [facility]


   Last Admin: 07/03/18 07:49 Dose:  25 mg


Pantoprazole Sodium (Protonix Inj)  40 mg IVP DAILY [facility]


   Last Admin: 07/02/18 09:07 Dose:  40 mg











- Labs


Labs: 


 





 07/03/18 06:14 





 07/03/18 06:14 











- Additional Findings


Additional findings: 





- Constitutional


Appears: Well, No Acute Distress





- Head Exam


Head Exam: ATRAUMATIC, NORMAL INSPECTION, NORMOCEPHALIC





- Eye Exam


Eye Exam: Normal appearance, Nystagmus


Additional comments: 





+legally blind





- ENT Exam


ENT Exam: Mucous Membranes Moist





- Neck Exam


Neck Exam: Full ROM





- Respiratory Exam


Respiratory Exam: Clear to Ausculation Bilateral, NORMAL BREATHING PATTERN.  

absent: Rales, Rhonchi, Wheezes





- Cardiovascular Exam


Cardiovascular Exam: REGULAR RHYTHM, +S1, +S2





- GI/Abdominal Exam


GI & Abdominal Exam: Soft, Normal Bowel Sounds.  absent: Guarding, Rigid, 

Tenderness





- Extremities Exam


Extremities Exam: Normal Inspection





- Neurological Exam


Neurological Exam: Alert, Awake, Oriented x3





- Psychiatric Exam


Psychiatric exam: Normal Affect, Normal Mood





- Skin


Skin Exam: Dry, Normal Color, Warm








Assessment and Plan





- Assessment and Plan (Free Text)


Assessment: 





A/P: Patient is a 58 year old male with past medical history of Hypertension, 

Diabetes Mellitus, bilateral vision loss 2/2 glaucoma presented to the ED with 

severe hypoglycemia, loss of conciousness and fever. As per wife, patient was 

having persistent vomiting for a month, he had a GI work up which was negative.





Severe Hypoglycemia


-Stable, afebrile


-CT head showed no acute intracranial hemorrhage


-Blood sugars have improved


-Will increase Lantus 8 units HS


-Moderate insulin sliding scale


-Hypoglycemia protocol


-Accuchecks ACHS


-Last A1C 7.2 in 6/2018


-PT eval ordered





Chest pain likely 2/2 GERD


-Troponins negative


-Echo showed EF 65-70%, abnormal left ventricular diastolic dysfunction


-Cardiology on consult, help appreciated


-Per cardio, If wall motion abnormalities or recurrent chest pain will get 

stress test 


-Mylanta daily 30ml PO daily


-Continue protonix 40mg IVP daily





Chronic Kidney Disease


-Kidney function improving 


-Continue to monitor at this time





Hypokalemia


-Continue to monitor and replete as needed





History of Hypertension


-Currently normatensive 


-Continue home dose of Norvasc and Metoprolol 





GI/DVT ppx:


Protonix 40mg IVP daily


Heparin 5000 units Q12H SC





DISPO: Will discharge the patient home today. Patient to continue Lantus 8 

units HS and Sliding scale at home. Will also prescribe Protonix 40mg PO daily 

for heart burn. Advised the patient to have his wife take pictures of his 

medications and bring it to his follow up appointment in the office. Plan 

discussed with Dr Juan Bobby DO PGY-2